# Patient Record
Sex: MALE | Race: WHITE | Employment: OTHER | URBAN - METROPOLITAN AREA
[De-identification: names, ages, dates, MRNs, and addresses within clinical notes are randomized per-mention and may not be internally consistent; named-entity substitution may affect disease eponyms.]

---

## 2022-01-05 ENCOUNTER — APPOINTMENT (OUTPATIENT)
Dept: ULTRASOUND IMAGING | Age: 57
DRG: 872 | End: 2022-01-05
Attending: EMERGENCY MEDICINE

## 2022-01-05 ENCOUNTER — HOSPITAL ENCOUNTER (INPATIENT)
Age: 57
LOS: 7 days | Discharge: HOME | DRG: 872 | End: 2022-01-12
Attending: EMERGENCY MEDICINE | Admitting: STUDENT IN AN ORGANIZED HEALTH CARE EDUCATION/TRAINING PROGRAM

## 2022-01-05 ENCOUNTER — APPOINTMENT (OUTPATIENT)
Dept: CT IMAGING | Age: 57
DRG: 872 | End: 2022-01-05
Attending: EMERGENCY MEDICINE

## 2022-01-05 ENCOUNTER — APPOINTMENT (OUTPATIENT)
Dept: GENERAL RADIOLOGY | Age: 57
DRG: 872 | End: 2022-01-05
Attending: STUDENT IN AN ORGANIZED HEALTH CARE EDUCATION/TRAINING PROGRAM

## 2022-01-05 DIAGNOSIS — L03.113 CELLULITIS OF RIGHT UPPER EXTREMITY: Primary | ICD-10-CM

## 2022-01-05 PROBLEM — I82.619 BASILIC VEIN THROMBOSIS: Status: ACTIVE | Noted: 2022-01-05

## 2022-01-05 PROBLEM — L03.90 CELLULITIS: Status: ACTIVE | Noted: 2022-01-05

## 2022-01-05 PROBLEM — E87.1 HYPONATREMIA: Status: ACTIVE | Noted: 2022-01-05

## 2022-01-05 PROBLEM — R05.9 COUGH: Status: ACTIVE | Noted: 2022-01-05

## 2022-01-05 PROBLEM — R07.9 CHEST PAIN: Status: ACTIVE | Noted: 2022-01-05

## 2022-01-05 PROBLEM — A41.9 SEPSIS (HCC): Status: ACTIVE | Noted: 2022-01-05

## 2022-01-05 PROBLEM — N17.9 AKI (ACUTE KIDNEY INJURY) (HCC): Status: ACTIVE | Noted: 2022-01-05

## 2022-01-05 LAB
ALBUMIN SERPL-MCNC: 3.3 G/DL (ref 3.5–5)
ALBUMIN/GLOB SERPL: 0.8 {RATIO} (ref 1.2–3.5)
ALP SERPL-CCNC: 73 U/L (ref 50–136)
ALT SERPL-CCNC: 38 U/L (ref 12–65)
ANION GAP SERPL CALC-SCNC: 7 MMOL/L (ref 7–16)
AST SERPL-CCNC: 19 U/L (ref 15–37)
ATRIAL RATE: 101 BPM
BASOPHILS # BLD: 0 K/UL (ref 0–0.2)
BASOPHILS NFR BLD: 0 % (ref 0–2)
BILIRUB SERPL-MCNC: 2.5 MG/DL (ref 0.2–1.1)
BILIRUB UR QL: ABNORMAL
BUN SERPL-MCNC: 18 MG/DL (ref 6–23)
CALCIUM SERPL-MCNC: 8.7 MG/DL (ref 8.3–10.4)
CALCULATED P AXIS, ECG09: 26 DEGREES
CALCULATED R AXIS, ECG10: 19 DEGREES
CALCULATED T AXIS, ECG11: 142 DEGREES
CHLORIDE SERPL-SCNC: 103 MMOL/L (ref 98–107)
CHOLEST SERPL-MCNC: 140 MG/DL
CO2 SERPL-SCNC: 23 MMOL/L (ref 21–32)
CREAT SERPL-MCNC: 1.69 MG/DL (ref 0.8–1.5)
CRP SERPL-MCNC: 26.6 MG/DL (ref 0–0.9)
DIAGNOSIS, 93000: NORMAL
DIFFERENTIAL METHOD BLD: ABNORMAL
EOSINOPHIL # BLD: 0 K/UL (ref 0–0.8)
EOSINOPHIL NFR BLD: 0 % (ref 0.5–7.8)
ERYTHROCYTE [DISTWIDTH] IN BLOOD BY AUTOMATED COUNT: 13.5 % (ref 11.9–14.6)
EST. AVERAGE GLUCOSE BLD GHB EST-MCNC: 103 MG/DL
GLOBULIN SER CALC-MCNC: 4 G/DL (ref 2.3–3.5)
GLUCOSE SERPL-MCNC: 140 MG/DL (ref 65–100)
GLUCOSE UR QL STRIP.AUTO: 250 MG/DL
HBA1C MFR BLD: 5.2 % (ref 4.2–6.3)
HCT VFR BLD AUTO: 40.6 % (ref 41.1–50.3)
HDLC SERPL-MCNC: 37 MG/DL (ref 40–60)
HDLC SERPL: 3.8 {RATIO}
HGB BLD-MCNC: 13.5 G/DL (ref 13.6–17.2)
IMM GRANULOCYTES # BLD AUTO: 0.1 K/UL (ref 0–0.5)
IMM GRANULOCYTES NFR BLD AUTO: 1 % (ref 0–5)
KETONES UR-MCNC: 15 MG/DL
LACTATE SERPL-SCNC: 1.4 MMOL/L (ref 0.4–2)
LACTATE SERPL-SCNC: 1.9 MMOL/L (ref 0.4–2)
LDLC SERPL CALC-MCNC: 85.8 MG/DL
LEUKOCYTE ESTERASE UR QL STRIP: NEGATIVE
LYMPHOCYTES # BLD: 0.3 K/UL (ref 0.5–4.6)
LYMPHOCYTES NFR BLD: 4 % (ref 13–44)
MCH RBC QN AUTO: 28.7 PG (ref 26.1–32.9)
MCHC RBC AUTO-ENTMCNC: 33.3 G/DL (ref 31.4–35)
MCV RBC AUTO: 86.2 FL (ref 79.6–97.8)
MONOCYTES # BLD: 0.3 K/UL (ref 0.1–1.3)
MONOCYTES NFR BLD: 4 % (ref 4–12)
NEUTS SEG # BLD: 7.4 K/UL (ref 1.7–8.2)
NEUTS SEG NFR BLD: 91 % (ref 43–78)
NITRITE UR QL: NEGATIVE
NRBC # BLD: 0 K/UL (ref 0–0.2)
P-R INTERVAL, ECG05: 155 MS
PH UR: 5.5 [PH] (ref 5–9)
PLATELET # BLD AUTO: 121 K/UL (ref 150–450)
PMV BLD AUTO: 10.5 FL (ref 9.4–12.3)
POTASSIUM SERPL-SCNC: 3.5 MMOL/L (ref 3.5–5.1)
PROCALCITONIN SERPL-MCNC: 2.13 NG/ML (ref 0–0.49)
PROT SERPL-MCNC: 7.3 G/DL (ref 6.3–8.2)
PROT UR QL: 100 MG/DL
Q-T INTERVAL, ECG07: 338 MS
QRS DURATION, ECG06: 91 MS
QTC CALCULATION (BEZET), ECG08: 439 MS
RBC # BLD AUTO: 4.71 M/UL (ref 4.23–5.6)
RBC # UR STRIP: ABNORMAL /UL
SERVICE CMNT-IMP: ABNORMAL
SODIUM SERPL-SCNC: 133 MMOL/L (ref 138–145)
SP GR UR: 1.02 (ref 1–1.02)
TRIGL SERPL-MCNC: 86 MG/DL (ref 35–150)
TROPONIN-HIGH SENSITIVITY: 22.1 PG/ML (ref 0–14)
URATE SERPL-MCNC: 4.9 MG/DL (ref 2.6–6)
UROBILINOGEN UR QL: 1 EU/DL (ref 0.2–1)
VENTRICULAR RATE, ECG03: 101 BPM
VLDLC SERPL CALC-MCNC: 17.2 MG/DL (ref 6–23)
WBC # BLD AUTO: 8.1 K/UL (ref 4.3–11.1)

## 2022-01-05 PROCEDURE — 87186 SC STD MICRODIL/AGAR DIL: CPT

## 2022-01-05 PROCEDURE — 74011250637 HC RX REV CODE- 250/637: Performed by: EMERGENCY MEDICINE

## 2022-01-05 PROCEDURE — 84145 PROCALCITONIN (PCT): CPT

## 2022-01-05 PROCEDURE — 96374 THER/PROPH/DIAG INJ IV PUSH: CPT

## 2022-01-05 PROCEDURE — 83605 ASSAY OF LACTIC ACID: CPT

## 2022-01-05 PROCEDURE — 87077 CULTURE AEROBIC IDENTIFY: CPT

## 2022-01-05 PROCEDURE — 96375 TX/PRO/DX INJ NEW DRUG ADDON: CPT

## 2022-01-05 PROCEDURE — 74011000636 HC RX REV CODE- 636: Performed by: EMERGENCY MEDICINE

## 2022-01-05 PROCEDURE — 74011250636 HC RX REV CODE- 250/636: Performed by: EMERGENCY MEDICINE

## 2022-01-05 PROCEDURE — 83036 HEMOGLOBIN GLYCOSYLATED A1C: CPT

## 2022-01-05 PROCEDURE — 84484 ASSAY OF TROPONIN QUANT: CPT

## 2022-01-05 PROCEDURE — 71045 X-RAY EXAM CHEST 1 VIEW: CPT

## 2022-01-05 PROCEDURE — 73201 CT UPPER EXTREMITY W/DYE: CPT

## 2022-01-05 PROCEDURE — 86140 C-REACTIVE PROTEIN: CPT

## 2022-01-05 PROCEDURE — 74011000258 HC RX REV CODE- 258: Performed by: STUDENT IN AN ORGANIZED HEALTH CARE EDUCATION/TRAINING PROGRAM

## 2022-01-05 PROCEDURE — 80053 COMPREHEN METABOLIC PANEL: CPT

## 2022-01-05 PROCEDURE — 87040 BLOOD CULTURE FOR BACTERIA: CPT

## 2022-01-05 PROCEDURE — 93005 ELECTROCARDIOGRAM TRACING: CPT | Performed by: EMERGENCY MEDICINE

## 2022-01-05 PROCEDURE — 74011250636 HC RX REV CODE- 250/636: Performed by: STUDENT IN AN ORGANIZED HEALTH CARE EDUCATION/TRAINING PROGRAM

## 2022-01-05 PROCEDURE — 87205 SMEAR GRAM STAIN: CPT

## 2022-01-05 PROCEDURE — 74011250637 HC RX REV CODE- 250/637: Performed by: STUDENT IN AN ORGANIZED HEALTH CARE EDUCATION/TRAINING PROGRAM

## 2022-01-05 PROCEDURE — 99285 EMERGENCY DEPT VISIT HI MDM: CPT

## 2022-01-05 PROCEDURE — 65270000029 HC RM PRIVATE

## 2022-01-05 PROCEDURE — 80061 LIPID PANEL: CPT

## 2022-01-05 PROCEDURE — 74011000258 HC RX REV CODE- 258: Performed by: EMERGENCY MEDICINE

## 2022-01-05 PROCEDURE — 93971 EXTREMITY STUDY: CPT

## 2022-01-05 PROCEDURE — 94762 N-INVAS EAR/PLS OXIMTRY CONT: CPT

## 2022-01-05 PROCEDURE — 84550 ASSAY OF BLOOD/URIC ACID: CPT

## 2022-01-05 PROCEDURE — 87150 DNA/RNA AMPLIFIED PROBE: CPT

## 2022-01-05 PROCEDURE — 96365 THER/PROPH/DIAG IV INF INIT: CPT

## 2022-01-05 PROCEDURE — 81003 URINALYSIS AUTO W/O SCOPE: CPT

## 2022-01-05 PROCEDURE — 85025 COMPLETE CBC W/AUTO DIFF WBC: CPT

## 2022-01-05 RX ORDER — ACETAMINOPHEN 325 MG/1
650 TABLET ORAL
Status: DISCONTINUED | OUTPATIENT
Start: 2022-01-05 | End: 2022-01-12 | Stop reason: HOSPADM

## 2022-01-05 RX ORDER — ONDANSETRON 2 MG/ML
4 INJECTION INTRAMUSCULAR; INTRAVENOUS ONCE
Status: COMPLETED | OUTPATIENT
Start: 2022-01-05 | End: 2022-01-05

## 2022-01-05 RX ORDER — ACETAMINOPHEN 500 MG
1000 TABLET ORAL
Status: COMPLETED | OUTPATIENT
Start: 2022-01-05 | End: 2022-01-05

## 2022-01-05 RX ORDER — SODIUM CHLORIDE 0.9 % (FLUSH) 0.9 %
5-40 SYRINGE (ML) INJECTION AS NEEDED
Status: DISCONTINUED | OUTPATIENT
Start: 2022-01-05 | End: 2022-01-12 | Stop reason: HOSPADM

## 2022-01-05 RX ORDER — HYDROMORPHONE HYDROCHLORIDE 1 MG/ML
0.5 INJECTION, SOLUTION INTRAMUSCULAR; INTRAVENOUS; SUBCUTANEOUS ONCE
Status: COMPLETED | OUTPATIENT
Start: 2022-01-05 | End: 2022-01-05

## 2022-01-05 RX ORDER — VANCOMYCIN HYDROCHLORIDE 1 G/20ML
INJECTION, POWDER, LYOPHILIZED, FOR SOLUTION INTRAVENOUS EVERY 24 HOURS
Status: DISCONTINUED | OUTPATIENT
Start: 2022-01-05 | End: 2022-01-05

## 2022-01-05 RX ORDER — BENZONATATE 100 MG/1
100 CAPSULE ORAL
Status: DISCONTINUED | OUTPATIENT
Start: 2022-01-05 | End: 2022-01-12 | Stop reason: HOSPADM

## 2022-01-05 RX ORDER — SODIUM CHLORIDE 0.9 % (FLUSH) 0.9 %
10 SYRINGE (ML) INJECTION
Status: COMPLETED | OUTPATIENT
Start: 2022-01-05 | End: 2022-01-05

## 2022-01-05 RX ORDER — GUAIFENESIN/DEXTROMETHORPHAN 100-10MG/5
10 SYRUP ORAL
Status: DISCONTINUED | OUTPATIENT
Start: 2022-01-05 | End: 2022-01-12 | Stop reason: HOSPADM

## 2022-01-05 RX ORDER — ONDANSETRON 2 MG/ML
4 INJECTION INTRAMUSCULAR; INTRAVENOUS
Status: DISCONTINUED | OUTPATIENT
Start: 2022-01-05 | End: 2022-01-12 | Stop reason: HOSPADM

## 2022-01-05 RX ORDER — POLYETHYLENE GLYCOL 3350 17 G/17G
17 POWDER, FOR SOLUTION ORAL DAILY PRN
Status: DISCONTINUED | OUTPATIENT
Start: 2022-01-05 | End: 2022-01-12 | Stop reason: HOSPADM

## 2022-01-05 RX ORDER — ENOXAPARIN SODIUM 100 MG/ML
40 INJECTION SUBCUTANEOUS DAILY
Status: DISCONTINUED | OUTPATIENT
Start: 2022-01-05 | End: 2022-01-12 | Stop reason: HOSPADM

## 2022-01-05 RX ORDER — VANCOMYCIN HYDROCHLORIDE
1250 EVERY 24 HOURS
Status: DISCONTINUED | OUTPATIENT
Start: 2022-01-05 | End: 2022-01-06

## 2022-01-05 RX ORDER — ONDANSETRON 4 MG/1
4 TABLET, ORALLY DISINTEGRATING ORAL
Status: DISCONTINUED | OUTPATIENT
Start: 2022-01-05 | End: 2022-01-12 | Stop reason: HOSPADM

## 2022-01-05 RX ORDER — CLINDAMYCIN PHOSPHATE 900 MG/50ML
900 INJECTION INTRAVENOUS
Status: COMPLETED | OUTPATIENT
Start: 2022-01-05 | End: 2022-01-05

## 2022-01-05 RX ORDER — MORPHINE SULFATE 4 MG/ML
4 INJECTION INTRAVENOUS
Status: DISCONTINUED | OUTPATIENT
Start: 2022-01-05 | End: 2022-01-07

## 2022-01-05 RX ORDER — SODIUM CHLORIDE 0.9 % (FLUSH) 0.9 %
5-40 SYRINGE (ML) INJECTION EVERY 8 HOURS
Status: DISCONTINUED | OUTPATIENT
Start: 2022-01-05 | End: 2022-01-12 | Stop reason: HOSPADM

## 2022-01-05 RX ORDER — SODIUM CHLORIDE 9 MG/ML
150 INJECTION, SOLUTION INTRAVENOUS CONTINUOUS
Status: DISCONTINUED | OUTPATIENT
Start: 2022-01-05 | End: 2022-01-07

## 2022-01-05 RX ORDER — ACETAMINOPHEN 650 MG/1
650 SUPPOSITORY RECTAL
Status: DISCONTINUED | OUTPATIENT
Start: 2022-01-05 | End: 2022-01-12 | Stop reason: HOSPADM

## 2022-01-05 RX ORDER — SODIUM CHLORIDE 0.9 % (FLUSH) 0.9 %
5-10 SYRINGE (ML) INJECTION EVERY 8 HOURS
Status: DISCONTINUED | OUTPATIENT
Start: 2022-01-05 | End: 2022-01-08

## 2022-01-05 RX ORDER — MORPHINE SULFATE 4 MG/ML
4 INJECTION INTRAVENOUS
Status: COMPLETED | OUTPATIENT
Start: 2022-01-05 | End: 2022-01-05

## 2022-01-05 RX ORDER — SODIUM CHLORIDE 0.9 % (FLUSH) 0.9 %
5-10 SYRINGE (ML) INJECTION AS NEEDED
Status: DISCONTINUED | OUTPATIENT
Start: 2022-01-05 | End: 2022-01-12 | Stop reason: HOSPADM

## 2022-01-05 RX ORDER — ENOXAPARIN SODIUM 100 MG/ML
40 INJECTION SUBCUTANEOUS DAILY
Status: DISCONTINUED | OUTPATIENT
Start: 2022-01-06 | End: 2022-01-05

## 2022-01-05 RX ADMIN — IOPAMIDOL 100 ML: 755 INJECTION, SOLUTION INTRAVENOUS at 13:33

## 2022-01-05 RX ADMIN — SODIUM CHLORIDE 150 ML/HR: 900 INJECTION, SOLUTION INTRAVENOUS at 21:36

## 2022-01-05 RX ADMIN — MORPHINE SULFATE 4 MG: 4 INJECTION INTRAVENOUS at 12:18

## 2022-01-05 RX ADMIN — MORPHINE SULFATE 4 MG: 4 INJECTION INTRAVENOUS at 18:03

## 2022-01-05 RX ADMIN — ONDANSETRON 4 MG: 2 INJECTION INTRAMUSCULAR; INTRAVENOUS at 12:18

## 2022-01-05 RX ADMIN — Medication 10 ML: at 13:34

## 2022-01-05 RX ADMIN — ACETAMINOPHEN 1000 MG: 500 TABLET ORAL at 11:43

## 2022-01-05 RX ADMIN — HYDROMORPHONE HYDROCHLORIDE 0.5 MG: 1 INJECTION, SOLUTION INTRAMUSCULAR; INTRAVENOUS; SUBCUTANEOUS at 21:33

## 2022-01-05 RX ADMIN — HYDROMORPHONE HYDROCHLORIDE 0.5 MG: 1 INJECTION, SOLUTION INTRAMUSCULAR; INTRAVENOUS; SUBCUTANEOUS at 15:12

## 2022-01-05 RX ADMIN — ACETAMINOPHEN 650 MG: 325 TABLET ORAL at 18:43

## 2022-01-05 RX ADMIN — CLINDAMYCIN PHOSPHATE 900 MG: 900 INJECTION, SOLUTION INTRAVENOUS at 12:31

## 2022-01-05 RX ADMIN — ENOXAPARIN SODIUM 40 MG: 100 INJECTION SUBCUTANEOUS at 20:08

## 2022-01-05 RX ADMIN — SODIUM CHLORIDE 150 ML/HR: 900 INJECTION, SOLUTION INTRAVENOUS at 13:57

## 2022-01-05 RX ADMIN — SODIUM CHLORIDE, SODIUM LACTATE, POTASSIUM CHLORIDE, AND CALCIUM CHLORIDE 1000 ML: 600; 310; 30; 20 INJECTION, SOLUTION INTRAVENOUS at 11:43

## 2022-01-05 RX ADMIN — SODIUM CHLORIDE 100 ML: 900 INJECTION, SOLUTION INTRAVENOUS at 13:33

## 2022-01-05 RX ADMIN — PIPERACILLIN SODIUM AND TAZOBACTAM SODIUM 3.38 G: 3; .375 INJECTION, POWDER, LYOPHILIZED, FOR SOLUTION INTRAVENOUS at 20:09

## 2022-01-05 RX ADMIN — VANCOMYCIN HYDROCHLORIDE 1250 MG: 500 INJECTION, POWDER, LYOPHILIZED, FOR SOLUTION INTRAVENOUS at 17:05

## 2022-01-05 NOTE — ED PROVIDER NOTES
Patient presents to the emergency department with complaints of pain and swelling to the right elbow as well as fever. He states he was working under a house 3 days ago in the morning and by the evening he noted a lesion or 2 lesions on his right elbow. Since then there has been increasing pain, swelling and redness as well as fever. He also reports some vomiting but denies diarrhea. He denies any history of gout. Review of systems otherwise negative. The history is provided by the patient. Rash   This is a new problem. The current episode started more than 2 days ago. The problem has been gradually worsening. Associated with: possible injury. There has been a fever of 100 - 100.9 F. Affected Location: right elbow and surrounding area. The pain is at a severity of 8/10. The pain is severe. The pain has been constant since onset. Associated symptoms include blisters and weeping. He has tried nothing for the symptoms. The treatment provided no relief. History reviewed. No pertinent past medical history. No past surgical history on file. History reviewed. No pertinent family history.     Social History     Socioeconomic History    Marital status:      Spouse name: Not on file    Number of children: Not on file    Years of education: Not on file    Highest education level: Not on file   Occupational History    Not on file   Tobacco Use    Smoking status: Not on file    Smokeless tobacco: Not on file   Substance and Sexual Activity    Alcohol use: Not on file    Drug use: Not on file    Sexual activity: Not on file   Other Topics Concern    Not on file   Social History Narrative    Not on file     Social Determinants of Health     Financial Resource Strain:     Difficulty of Paying Living Expenses: Not on file   Food Insecurity:     Worried About Running Out of Food in the Last Year: Not on file    Sumit of Food in the Last Year: Not on file   Transportation Needs:     Lack of Transportation (Medical): Not on file    Lack of Transportation (Non-Medical): Not on file   Physical Activity:     Days of Exercise per Week: Not on file    Minutes of Exercise per Session: Not on file   Stress:     Feeling of Stress : Not on file   Social Connections:     Frequency of Communication with Friends and Family: Not on file    Frequency of Social Gatherings with Friends and Family: Not on file    Attends Christianity Services: Not on file    Active Member of [address] [place] [address] or Organizations: Not on file    Attends Club or Organization Meetings: Not on file    Marital Status: Not on file   Intimate Partner Violence:     Fear of Current or Ex-Partner: Not on file    Emotionally Abused: Not on file    Physically Abused: Not on file    Sexually Abused: Not on file   Housing Stability:     Unable to Pay for Housing in the Last Year: Not on file    Number of [place] in the Last Year: Not on file    Unstable Housing in the Last Year: Not on file         ALLERGIES: Patient has no known allergies. Review of Systems   Constitutional: Positive for chills and fever. Gastrointestinal: Positive for nausea and vomiting. Skin: Positive for rash. All other systems reviewed and are negative. Vitals:    01/05/22 1037 01/05/22 1149   BP: (!) 163/76 132/78   Pulse: (!) 113 97   Resp: 22 19   Temp: (!) 100.9 °F (38.3 °C)    SpO2: 98%    Weight: 95.3 kg (210 lb)             Physical Exam  Vitals and nursing note reviewed. Constitutional:       General: He is not in acute distress. Appearance: Normal appearance. He is not ill-appearing, toxic-appearing or diaphoretic. HENT:      Head: Normocephalic and atraumatic. Eyes:      Extraocular Movements: Extraocular movements intact. Conjunctiva/sclera: Conjunctivae normal.      Pupils: Pupils are equal, round, and reactive to light. Cardiovascular:      Rate and Rhythm: Normal rate and regular rhythm.    Pulmonary:      Effort: Pulmonary effort is normal.   Chest:      Chest wall: No tenderness. Abdominal:      General: There is no distension. Tenderness: There is no abdominal tenderness. There is no guarding or rebound. Musculoskeletal:         General: Swelling and tenderness present. Right elbow: Swelling present. Tenderness present. Arms:       Comments: No evidence of compartment syndrome   Skin:     General: Skin is warm and dry. Capillary Refill: Capillary refill takes less than 2 seconds. Findings: Erythema present. Comments: There is swelling and induration surrounding the right elbow extending into the forearm and into the upper arm. There are 2 bulla noted overlying the elbow weeping serous fluid. Neurological:      Mental Status: He is alert. Psychiatric:         Mood and Affect: Mood normal.         Behavior: Behavior normal.         Thought Content: Thought content normal.          MDM  Number of Diagnoses or Management Options  Diagnosis management comments: Given the rapidity of the induration and erythema as well as fever, possibility of necrotizing fasciitis and is entertained patient will have a CT to evaluate further.        Amount and/or Complexity of Data Reviewed  Clinical lab tests: ordered and reviewed  Tests in the radiology section of CPT®: ordered and reviewed  Review and summarize past medical records: yes  Independent visualization of images, tracings, or specimens: yes    Risk of Complications, Morbidity, and/or Mortality  Presenting problems: moderate  Diagnostic procedures: moderate  Management options: moderate    Patient Progress  Patient progress: stable         Procedures

## 2022-01-05 NOTE — H&P
Hospitalist History and Physical   Admit Date:  2022 11:17 AM   Name:  Chapincito Hwang   Age:  64 y.o. Sex:  male  :  1965   MRN:  [id number]   Room:  [facility]/    Presenting Complaint: Blood infection    Reason(s) for Admission: Cellulitis [L03.90]     History of Present Illness:   Chapincito Hwang is a 64 y.o. male with no past medical history who presented with swelling, pain and erythema to his right elbow. 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  Patient was unsure of whether or not he had scraped his elbow or was bitten by a spider while under the home. Patient stated that the evening he began having some itching and noticed 2 lesions appear on his right elbow. Since that time there is just been increasing erythema, swelling pain and patient had developed fever. On elbow there were 2 areas of blisters that were weeping. Patient has not seen a primary care provider in several years. Patient's wife and son at bedside. Patient rated pain 8 out of 10 but relieved with pain medication that he had received in the emergency department. Patient takes Aleve daily for aches and pains due to his work and was taking for the pain in his elbow. Patient also complained of some cough since Friday. Patient presented with fever of 101.7, CRP of 26, pro-Tae 2.3. CT of his right upper extremities showed subcutaneous edema compatible with cellulitis. No evidence of soft tissue gas or abscess. There was questionable DVT in the right brachial vein. Ultrasound was ordered which showed complete thrombosis of his right basilic vein. Patient was started on empiric antibiotics. Review of Systems:  10 systems reviewed and negative except as noted in HPI.   Assessment & Plan:   Sepsis secondary to cellulitis (2022)  Tachycardia, fever, evidence of infection  Blood cultures pending  Started on empiric vancomycin and Zosyn  Maintenance IV fluids  Tylenol for fever  Consider infectious disease consult if worsening of cellulitis  Morphine as needed    Right basilic vein thrombosis  Initially seen on CT of his right upper extremity  Doppler ultrasound showed complete thrombus of right basilic vein  Continue warm compresses    Cough/chest pain  Chest x-ray without acute process  Patient complained of some pain with cough, can be secondary to subluxed rib. Tessalon Perle, Robitussin  EKG showed left ventricular hypertrophy without ST T wave inversions  Troponin slightly elevated, will get repeat troponin    Acute kidney injury  Creatinine 1.69  Likely secondary to hypovolemia  Continue maintenance IV fluids  Follow-up morning BMP    Hyponatremia  Continue maintenance IV fluids  Follow-up morning BMP      Dispo/Discharge Planning:   Dispo pending clinical course    Diet: ADULT DIET Regular  VTE ppx: Lovenox  Code status: Full Code    Hospital Problems as of 1/5/2022 Never Reviewed          Codes Class Noted - Resolved POA    Cellulitis ICD-10-CM: L03.90  ICD-9-CM: 682.9  1/5/2022 - Present Unknown              Past History:  History reviewed. No pertinent past medical history. No past surgical history on file. No Known Allergies   Social History     Tobacco Use    Smoking status: Not on file    Smokeless tobacco: Not on file   Substance Use Topics    Alcohol use: Not on file      History reviewed. No pertinent family history. Family history reviewed and negative except as noted above. There is no immunization history on file for this patient.   Prior to Admit Medications:  Current Outpatient Medications   Medication Instructions    HYDROcodone-acetaminophen (NORCO) 5-325 mg per tablet 1 Tablet, Oral, EVERY 4 HOURS AS NEEDED       Objective:     Patient Vitals for the past 24 hrs:   Temp Pulse Resp BP SpO2   01/05/22 1845  99 22  91 %   01/05/22 1844  (!) 108 18  (!) 89 %   01/05/22 1830 (!) 100.9 °F (38.3 °C) 96 12 128/80 93 %   01/05/22 1731  (!) 105 26 (!) 107/93 94 %   01/05/22 1700  87 17 128/75 95 %   01/05/22 1630  88 14 132/70    01/05/22 1430 98.8 °F (37.1 °C) 95 20 121/70 95 %   01/05/22 1400  95 16 130/74 98 %   01/05/22 1316  91 14  93 %   01/05/22 1315  94 14 122/70    01/05/22 1218 (!) 101.7 °F (38.7 °C)       01/05/22 1149  97 19 132/78    01/05/22 1124 (!) 102.2 °F (39 °C) (!) 101 16  97 %   01/05/22 1037 (!) 100.9 °F (38.3 °C) (!) 113 22 (!) 163/76 98 %     Oxygen Therapy  O2 Sat (%): 91 % (01/05/22 1845)  Pulse via Oximetry: 98 beats per minute (01/05/22 1845)  O2 Device: Nasal cannula (01/05/22 1845)  O2 Flow Rate (L/min): 2 l/min (01/05/22 1845)    Estimated body mass index is 28.48 kg/m² as calculated from the following:    Height as of this encounter: 6' (1.829 m). Weight as of this encounter: 95.3 kg (210 lb). Intake/Output Summary (Last 24 hours) at 1/5/2022 1850  Last data filed at 1/5/2022 1334  Gross per 24 hour   Intake 150 ml   Output    Net 150 ml         Physical Exam:  Blood pressure 128/80, pulse 99, temperature (!) 100.9 °F (38.3 °C), resp. rate 22, height 6' (1.829 m), weight 95.3 kg (210 lb), SpO2 91 %. General:    Well nourished. No overt distress  Head:  Normocephalic, atraumatic  Eyes:  Sclerae appear normal.  Pupils equally round. ENT:  Nares appear normal, no drainage. Moist oral mucosa  Neck:  No restricted ROM. Trachea midline   CV:   RRR. No m/r/g. No jugular venous distension. Lungs:   CTAB. No wheezing, rhonchi, or rales. Respirations even, unlabored  Abdomen: Bowel sounds present. Soft, nontender, nondistended. Extremities: No cyanosis or clubbing. Swelling to right upper extremity  Skin:     Swelling, erythema, two blisters to right elbow, very large area of cellulitis to right upper extremity  Neuro:  CN II-XII grossly intact. Sensation intact. A&Ox3  Psych:  Normal mood and affect.       I have reviewed ordered lab tests and independently visualized imaging below:    Last 24hr Labs:  Recent Results (from the past 24 hour(s))   LACTIC ACID    Collection Time: 01/05/22 10:43 AM   Result Value Ref Range    Lactic acid 1.9 0.4 - 2.0 MMOL/L   CBC WITH AUTOMATED DIFF    Collection Time: 01/05/22 10:43 AM   Result Value Ref Range    WBC 8.1 4.3 - 11.1 K/uL    RBC 4.71 4.23 - 5.6 M/uL    HGB 13.5 (L) 13.6 - 17.2 g/dL    HCT 40.6 (L) 41.1 - 50.3 %    MCV 86.2 79.6 - 97.8 FL    MCH 28.7 26.1 - 32.9 PG    MCHC 33.3 31.4 - 35.0 g/dL    RDW 13.5 11.9 - 14.6 %    PLATELET 661 (L) 216 - 450 K/uL    MPV 10.5 9.4 - 12.3 FL    ABSOLUTE NRBC 0.00 0.0 - 0.2 K/uL    DF AUTOMATED      NEUTROPHILS 91 (H) 43 - 78 %    LYMPHOCYTES 4 (L) 13 - 44 %    MONOCYTES 4 4.0 - 12.0 %    EOSINOPHILS 0 (L) 0.5 - 7.8 %    BASOPHILS 0 0.0 - 2.0 %    IMMATURE GRANULOCYTES 1 0.0 - 5.0 %    ABS. NEUTROPHILS 7.4 1.7 - 8.2 K/UL    ABS. LYMPHOCYTES 0.3 (L) 0.5 - 4.6 K/UL    ABS. MONOCYTES 0.3 0.1 - 1.3 K/UL    ABS. EOSINOPHILS 0.0 0.0 - 0.8 K/UL    ABS. BASOPHILS 0.0 0.0 - 0.2 K/UL    ABS. IMM. GRANS. 0.1 0.0 - 0.5 K/UL   METABOLIC PANEL, COMPREHENSIVE    Collection Time: 01/05/22 10:43 AM   Result Value Ref Range    Sodium 133 (L) 138 - 145 mmol/L    Potassium 3.5 3.5 - 5.1 mmol/L    Chloride 103 98 - 107 mmol/L    CO2 23 21 - 32 mmol/L    Anion gap 7 7 - 16 mmol/L    Glucose 140 (H) 65 - 100 mg/dL    BUN 18 6 - 23 MG/DL    Creatinine 1.69 (H) 0.8 - 1.5 MG/DL    GFR est AA 54 (L) >60 ml/min/1.73m2    GFR est non-AA 45 (L) >60 ml/min/1.73m2    Calcium 8.7 8.3 - 10.4 MG/DL    Bilirubin, total 2.5 (H) 0.2 - 1.1 MG/DL    ALT (SGPT) 38 12 - 65 U/L    AST (SGOT) 19 15 - 37 U/L    Alk.  phosphatase 73 50 - 136 U/L    Protein, total 7.3 6.3 - 8.2 g/dL    Albumin 3.3 (L) 3.5 - 5.0 g/dL    Globulin 4.0 (H) 2.3 - 3.5 g/dL    A-G Ratio 0.8 (L) 1.2 - 3.5     PROCALCITONIN    Collection Time: 01/05/22 10:43 AM   Result Value Ref Range    Procalcitonin 2.13 (H) 0.00 - 0.49 ng/mL   C REACTIVE PROTEIN, QT    Collection Time: 01/05/22 10:43 AM   Result Value Ref Range    C-Reactive protein 26.6 (H) 0.0 - 0.9 mg/dL   URIC ACID    Collection Time: 01/05/22 10:43 AM   Result Value Ref Range    Uric acid 4.9 2.6 - 6.0 MG/DL   TROPONIN-HIGH SENSITIVITY    Collection Time: 01/05/22 10:43 AM   Result Value Ref Range    Troponin-High Sensitivity 22.1 (H) 0 - 14 pg/mL   LIPID PANEL    Collection Time: 01/05/22 10:43 AM   Result Value Ref Range    Cholesterol, total 140 <200 MG/DL    Triglyceride 86 35 - 150 MG/DL    HDL Cholesterol 37 (L) 40 - 60 MG/DL    LDL, calculated 85.8 <100 MG/DL    VLDL, calculated 17.2 6.0 - 23.0 MG/DL    CHOL/HDL Ratio 3.8     HEMOGLOBIN A1C WITH EAG    Collection Time: 01/05/22 10:43 AM   Result Value Ref Range    Hemoglobin A1c 5.2 4.20 - 6.30 %    Est. average glucose 103 mg/dL   EKG, 12 LEAD, INITIAL    Collection Time: 01/05/22 11:23 AM   Result Value Ref Range    Ventricular Rate 101 BPM    Atrial Rate 101 BPM    P-R Interval 155 ms    QRS Duration 91 ms    Q-T Interval 338 ms    QTC Calculation (Bezet) 439 ms    Calculated P Axis 26 degrees    Calculated R Axis 19 degrees    Calculated T Axis 142 degrees    Diagnosis       Sinus tachycardia  Probable left atrial enlargement  LVH with secondary repolarization abnormality    Confirmed by ST BETH PABLO MD (), IMELDA MEDINA ([id number]) on 1/5/2022 3:19:05 PM     LACTIC ACID    Collection Time: 01/05/22 12:28 PM   Result Value Ref Range    Lactic acid 1.4 0.4 - 2.0 MMOL/L   POC URINE MACROSCOPIC    Collection Time: 01/05/22  1:17 PM   Result Value Ref Range    Spec. gravity (POC) 1.025 (H) 1.001 - 1.023      pH, urine  (POC) 5.5 5.0 - 9.0      Protein (POC) 100 (A) NEG mg/dL    Glucose, urine (POC) 250 (A) NEG mg/dL    Ketones (POC) 15 (A) NEG mg/dL    Bilirubin (POC) SMALL (A) NEG      Blood (POC) SMALL (A) NEG      Urobilinogen (POC) 1.0 0.2 - 1.0 EU/dL    Nitrite (POC) Negative NEG      Leukocyte esterase (POC) Negative NEG      Performed by Linda Lang        All Micro Results     Procedure Component Value Units Date/Time    BLOOD CULTURE [[id number]] Collected: 01/05/22 1228    Order Status: Completed Specimen: Blood Updated: 01/05/22 1255    BLOOD CULTURE [[id number]] Collected: 01/05/22 1043    Order Status: Completed Specimen: Blood Updated: 01/05/22 1125          Other Studies:  CT UP EXT RT W CONT    Result Date: 1/5/2022  Exam: CT of the right upper extremity with contrast INDICATION:  Concern for necrotizing fasciitis of the right upper extremity Comparison: None. Multiple axial images were obtained through the right upper extremity after intravenous injection of 100mL of Isovue 370. Radiation dose reduction techniques were used for this study: All CT scans performed at this facility use one or all of the following: Automated exposure control, adjustment of the mA and/or kVp according to patient's size, iterative reconstruction. FINDINGS: There is subcutaneous edema centered along the dorsal aspect of the upper arm, beginning approximately 16 cm above the elbow joint and extending distally to the level of the wrist with involvement of both volar and dorsal subcutaneous soft tissues of the forearm. There is confluent edema but no discrete organized rim-enhancing fluid collection. No soft tissue gas. Evaluation for deep compartment involvement is limited by CT. There is a filling defect within the right brachial vein with more distal involvement difficult to exclude. No acute osseous abnormality. 1. Significant subcutaneous edema involving the right upper arm compatible with cellulitis. No evidence of soft tissue gas or abscess. Evaluation for deep compartment involvement is limited by CT. 2. Filling defect within at least the right brachial vein concerning for deep vein thrombosis. Recommend correlation with right right upper extremity ultrasound.      XR CHEST PORT    Result Date: 1/5/2022  EXAM: XR CHEST PORT INDICATION: meets SIRS criteria COMPARISON: None FINDINGS: The cardiomediastinal silhouette is within normal limits. No focal parenchymal process. No pleural effusion. No pneumothorax. No acute osseous abnormality. No acute cardiopulmonary abnormality. DUPLEX UPPER EXT VENOUS RIGHT    Result Date: 1/5/2022  Right upper extremity venous ultrasound INDICATION:  Right arm swelling FINDINGS: Doppler ultrasound of the right upper extremity shows complete thrombosis of the right basilic vein. Subclavian and axillary veins are patent. Cephalic and forearm veins are also patent.      Thrombosis of the right basilic vein       Medications Administered     0.9% sodium chloride infusion     Admin Date  01/05/2022 Action  New Bag Dose  150 mL/hr Rate  150 mL/hr Route  IntraVENous Administered By  Judith Ruiz RN          acetaminophen (TYLENOL) tablet 1,000 mg     Admin Date  01/05/2022 Action  Given Dose  1,000 mg Route  Oral Administered By  Judith Ruiz RN          acetaminophen (TYLENOL) tablet 650 mg     Admin Date  01/05/2022 Action  Given Dose  650 mg Route  Oral Administered By  Judith Ruiz RN          clindamycin (CLEOCIN) 900mg D5W 50mL IVPB (premix)     Admin Date  01/05/2022 Action  New Bag Dose  900 mg Rate  100 mL/hr Route  IntraVENous Administered By  Judith Ruiz RN          HYDROmorphone (DILAUDID) injection 0.5 mg     Admin Date  01/05/2022 Action  Given Dose  0.5 mg Route  IntraVENous Administered By  Garth Lowe RN          iopamidoL (ISOVUE-370) 76 % injection 100 mL     Admin Date  01/05/2022 Action  Given Dose  100 mL Route  IntraVENous Administered By  Jone Barbour          lactated ringers bolus infusion 1,000 mL     Admin Date  01/05/2022 Action  New Bag Dose  1,000 mL Rate  1,000 mL/hr Route  IntraVENous Administered By  Judith Ruiz RN          morphine injection 4 mg     Admin Date  01/05/2022 Action  Given Dose  4 mg Route  IntraVENous Administered By  Ramila Santana RN           Admin Date  01/05/2022 Action  Given Dose  4 mg Route  IntraVENous Administered By  Judith Ruiz RN          ondansetron [facility]) injection 4 mg     Admin Date  01/05/2022 Action  Given Dose  4 mg Route  IntraVENous Administered By  Ramila Santana RN          saline peripheral flush soln 10 mL     Admin Date  01/05/2022 Action  Given Dose  10 mL Route  InterCATHeter Administered By  Jone Brabour          sodium chloride 0.9 % bolus infusion 100 mL     Admin Date  01/05/2022 Action  New Bag Dose  100 mL Route  IntraVENous Administered By  Jone Barbour          vancomycin [facility]) 1250 mg in  ml infusion     Admin Date  01/05/2022 Action  New Bag Dose  1,250 mg Rate  125 mL/hr Route  IntraVENous Administered By  Garth Lowe RN                Signed:  Alexus Lugo MD    Part of this note may have been written by using a voice dictation software. The note has been proof read but may still contain some grammatical/other typographical errors.

## 2022-01-05 NOTE — ED NOTES
Patient denies wanting dinner tray at this time stating he is not hungry. Patient denies any needs at this time.

## 2022-01-05 NOTE — PROGRESS NOTES
[facility] Pharmacokinetic Monitoring Service - Vancomycin     Bobby Yao is a 64 y.o. male starting on vancomycin therapy for SSTI. Pharmacy consulted by Dr. Colin Reddy for monitoring and adjustment. Target Concentration: Goal AUC/MURRAY 400-600 mg*hr/L    Additional Antimicrobials: N/A    Pertinent Laboratory Values: Wt Readings from Last 1 Encounters:   01/05/22 95.3 kg (210 lb)     Temp Readings from Last 1 Encounters:   01/05/22 (!) 101.7 °F (38.7 °C)     No components found for: PROCAL  Recent Labs     01/05/22  1228 01/05/22  1043   BUN  --  18   CREA  --  1.69*   WBC  --  8.1   PCT  --  2.13*   LAC 1.4 1.9     Estimated Creatinine Clearance: 58.5 mL/min (A) (based on SCr of 1.69 mg/dL (H)). No results found for: Todd Viera    MRSA Nasal Swab: N/A. Non-respiratory infection. .    Plan:  Dosing recommendations based on Bayesian software  Start vancomycin 1250 mg q24 hours  Anticipated AUC of 421 and trough concentration of 12.8 at steady state  Renal labs as indicated   Vancomycin concentration ordered for 1/6 @ 0400   Pharmacy will continue to monitor patient and adjust therapy as indicated    Thank you for the consult,  Adela Bar

## 2022-01-05 NOTE — ED TRIAGE NOTES
Patient presents with redness, swelling, and warmth to right elbow. Patient with fever and tachycardia. Patient presents with blue line drawn by family and spreading redness.

## 2022-01-06 ENCOUNTER — APPOINTMENT (OUTPATIENT)
Dept: NON INVASIVE DIAGNOSTICS | Age: 57
DRG: 872 | End: 2022-01-06
Attending: STUDENT IN AN ORGANIZED HEALTH CARE EDUCATION/TRAINING PROGRAM

## 2022-01-06 ENCOUNTER — APPOINTMENT (OUTPATIENT)
Dept: CT IMAGING | Age: 57
DRG: 872 | End: 2022-01-06
Attending: NURSE PRACTITIONER

## 2022-01-06 PROBLEM — I76 SEPTIC EMBOLISM (HCC): Status: ACTIVE | Noted: 2022-01-06

## 2022-01-06 PROBLEM — R78.81 BACTEREMIA DUE TO GRAM-POSITIVE BACTERIA: Status: ACTIVE | Noted: 2022-01-06

## 2022-01-06 LAB
ACC. NO. FROM MICRO ORDER, ACCP: ABNORMAL
ALBUMIN SERPL-MCNC: 2.3 G/DL (ref 3.5–5)
ALBUMIN/GLOB SERPL: 0.6 {RATIO} (ref 1.2–3.5)
ALP SERPL-CCNC: 48 U/L (ref 50–136)
ALT SERPL-CCNC: 32 U/L (ref 12–65)
ANION GAP SERPL CALC-SCNC: 9 MMOL/L (ref 7–16)
AST SERPL-CCNC: 26 U/L (ref 15–37)
BASOPHILS # BLD: 0 K/UL (ref 0–0.2)
BASOPHILS NFR BLD: 1 % (ref 0–2)
BILIRUB SERPL-MCNC: 2 MG/DL (ref 0.2–1.1)
BUN SERPL-MCNC: 19 MG/DL (ref 6–23)
CALCIUM SERPL-MCNC: 8.3 MG/DL (ref 8.3–10.4)
CHLORIDE SERPL-SCNC: 103 MMOL/L (ref 98–107)
CO2 SERPL-SCNC: 21 MMOL/L (ref 21–32)
COVID-19 RAPID TEST, COVR: NOT DETECTED
CREAT SERPL-MCNC: 1.46 MG/DL (ref 0.8–1.5)
CRP SERPL-MCNC: 25.9 MG/DL (ref 0–0.9)
DIFFERENTIAL METHOD BLD: ABNORMAL
EOSINOPHIL # BLD: 0 K/UL (ref 0–0.8)
EOSINOPHIL NFR BLD: 0 % (ref 0.5–7.8)
ERYTHROCYTE [DISTWIDTH] IN BLOOD BY AUTOMATED COUNT: 14.1 % (ref 11.9–14.6)
GLOBULIN SER CALC-MCNC: 3.6 G/DL (ref 2.3–3.5)
GLUCOSE SERPL-MCNC: 110 MG/DL (ref 65–100)
HCT VFR BLD AUTO: 34.6 % (ref 41.1–50.3)
HGB BLD-MCNC: 11.4 G/DL (ref 13.6–17.2)
HIV 1+2 AB+HIV1 P24 AG SERPL QL IA: NONREACTIVE
HIV12 RESULT COMMENT, HHIVC: ABNORMAL
IMM GRANULOCYTES # BLD AUTO: 0 K/UL (ref 0–0.5)
IMM GRANULOCYTES NFR BLD AUTO: 0 % (ref 0–5)
INTERPRETATION: ABNORMAL
LACTATE SERPL-SCNC: 1.1 MMOL/L (ref 0.4–2)
LYMPHOCYTES # BLD: 0.4 K/UL (ref 0.5–4.6)
LYMPHOCYTES NFR BLD: 9 % (ref 13–44)
MAGNESIUM SERPL-MCNC: 2.6 MG/DL (ref 1.8–2.4)
MCH RBC QN AUTO: 28.6 PG (ref 26.1–32.9)
MCHC RBC AUTO-ENTMCNC: 32.9 G/DL (ref 31.4–35)
MCV RBC AUTO: 86.7 FL (ref 79.6–97.8)
MECA (METHICILLIN-RESISTANCE GENES), MRGP: DETECTED
MONOCYTES # BLD: 0.4 K/UL (ref 0.1–1.3)
MONOCYTES NFR BLD: 9 % (ref 4–12)
NEUTS SEG # BLD: 4 K/UL (ref 1.7–8.2)
NEUTS SEG NFR BLD: 82 % (ref 43–78)
NRBC # BLD: 0 K/UL (ref 0–0.2)
PLATELET # BLD AUTO: 94 K/UL (ref 150–450)
PMV BLD AUTO: 11.4 FL (ref 9.4–12.3)
POTASSIUM SERPL-SCNC: 4 MMOL/L (ref 3.5–5.1)
PROCALCITONIN SERPL-MCNC: 6.43 NG/ML (ref 0–0.49)
PROT SERPL-MCNC: 5.9 G/DL (ref 6.3–8.2)
RBC # BLD AUTO: 3.99 M/UL (ref 4.23–5.6)
SODIUM SERPL-SCNC: 133 MMOL/L (ref 136–145)
SOURCE, COVRS: NORMAL
STAPHYLOCOCCUS AUREUS: DETECTED
STAPHYLOCOCCUS, STAPP: DETECTED
TROPONIN-HIGH SENSITIVITY: 35.7 PG/ML (ref 0–14)
VANCOMYCIN SERPL-MCNC: 6.4 UG/ML
WBC # BLD AUTO: 4.9 K/UL (ref 4.3–11.1)

## 2022-01-06 PROCEDURE — 74011000250 HC RX REV CODE- 250: Performed by: STUDENT IN AN ORGANIZED HEALTH CARE EDUCATION/TRAINING PROGRAM

## 2022-01-06 PROCEDURE — 74011000258 HC RX REV CODE- 258: Performed by: STUDENT IN AN ORGANIZED HEALTH CARE EDUCATION/TRAINING PROGRAM

## 2022-01-06 PROCEDURE — 2709999900 HC NON-CHARGEABLE SUPPLY

## 2022-01-06 PROCEDURE — 74011000636 HC RX REV CODE- 636: Performed by: INTERNAL MEDICINE

## 2022-01-06 PROCEDURE — 85025 COMPLETE CBC W/AUTO DIFF WBC: CPT

## 2022-01-06 PROCEDURE — 65270000029 HC RM PRIVATE

## 2022-01-06 PROCEDURE — 74011250637 HC RX REV CODE- 250/637: Performed by: STUDENT IN AN ORGANIZED HEALTH CARE EDUCATION/TRAINING PROGRAM

## 2022-01-06 PROCEDURE — 74011000250 HC RX REV CODE- 250: Performed by: EMERGENCY MEDICINE

## 2022-01-06 PROCEDURE — 74011250637 HC RX REV CODE- 250/637: Performed by: INTERNAL MEDICINE

## 2022-01-06 PROCEDURE — 36415 COLL VENOUS BLD VENIPUNCTURE: CPT

## 2022-01-06 PROCEDURE — 74011250637 HC RX REV CODE- 250/637: Performed by: EMERGENCY MEDICINE

## 2022-01-06 PROCEDURE — 87635 SARS-COV-2 COVID-19 AMP PRB: CPT

## 2022-01-06 PROCEDURE — 86140 C-REACTIVE PROTEIN: CPT

## 2022-01-06 PROCEDURE — 83605 ASSAY OF LACTIC ACID: CPT

## 2022-01-06 PROCEDURE — 74011000258 HC RX REV CODE- 258: Performed by: INTERNAL MEDICINE

## 2022-01-06 PROCEDURE — 83735 ASSAY OF MAGNESIUM: CPT

## 2022-01-06 PROCEDURE — 74011250636 HC RX REV CODE- 250/636: Performed by: EMERGENCY MEDICINE

## 2022-01-06 PROCEDURE — 84145 PROCALCITONIN (PCT): CPT

## 2022-01-06 PROCEDURE — 87389 HIV-1 AG W/HIV-1&-2 AB AG IA: CPT

## 2022-01-06 PROCEDURE — 74011250636 HC RX REV CODE- 250/636: Performed by: STUDENT IN AN ORGANIZED HEALTH CARE EDUCATION/TRAINING PROGRAM

## 2022-01-06 PROCEDURE — 80053 COMPREHEN METABOLIC PANEL: CPT

## 2022-01-06 PROCEDURE — 74011250636 HC RX REV CODE- 250/636: Performed by: INTERNAL MEDICINE

## 2022-01-06 PROCEDURE — 71260 CT THORAX DX C+: CPT

## 2022-01-06 PROCEDURE — 80202 ASSAY OF VANCOMYCIN: CPT

## 2022-01-06 RX ORDER — HYDROMORPHONE HYDROCHLORIDE 1 MG/ML
0.2 INJECTION, SOLUTION INTRAMUSCULAR; INTRAVENOUS; SUBCUTANEOUS
Status: DISCONTINUED | OUTPATIENT
Start: 2022-01-06 | End: 2022-01-07

## 2022-01-06 RX ORDER — VANCOMYCIN HYDROCHLORIDE
1250
Status: DISCONTINUED | OUTPATIENT
Start: 2022-01-06 | End: 2022-01-09

## 2022-01-06 RX ORDER — IBUPROFEN 600 MG/1
600 TABLET ORAL
COMMUNITY
End: 2022-01-12

## 2022-01-06 RX ORDER — SODIUM CHLORIDE 0.9 % (FLUSH) 0.9 %
10 SYRINGE (ML) INJECTION
Status: COMPLETED | OUTPATIENT
Start: 2022-01-06 | End: 2022-01-06

## 2022-01-06 RX ORDER — CHOLECALCIFEROL (VITAMIN D3) 125 MCG
5 CAPSULE ORAL
Status: DISCONTINUED | OUTPATIENT
Start: 2022-01-06 | End: 2022-01-12 | Stop reason: HOSPADM

## 2022-01-06 RX ORDER — HYDROCODONE BITARTRATE AND ACETAMINOPHEN 5; 325 MG/1; MG/1
1 TABLET ORAL
Status: DISCONTINUED | OUTPATIENT
Start: 2022-01-06 | End: 2022-01-07

## 2022-01-06 RX ORDER — DIPHENHYDRAMINE HCL 25 MG
25 CAPSULE ORAL
Status: DISCONTINUED | OUTPATIENT
Start: 2022-01-06 | End: 2022-01-12 | Stop reason: HOSPADM

## 2022-01-06 RX ADMIN — PIPERACILLIN SODIUM AND TAZOBACTAM SODIUM 3.38 G: 3; .375 INJECTION, POWDER, LYOPHILIZED, FOR SOLUTION INTRAVENOUS at 12:34

## 2022-01-06 RX ADMIN — SODIUM CHLORIDE, PRESERVATIVE FREE 10 ML: 5 INJECTION INTRAVENOUS at 21:48

## 2022-01-06 RX ADMIN — SODIUM CHLORIDE, PRESERVATIVE FREE 10 ML: 5 INJECTION INTRAVENOUS at 15:00

## 2022-01-06 RX ADMIN — SODIUM CHLORIDE 150 ML/HR: 900 INJECTION, SOLUTION INTRAVENOUS at 15:13

## 2022-01-06 RX ADMIN — HYDROCODONE BITARTRATE AND ACETAMINOPHEN 1 TABLET: 5; 325 TABLET ORAL at 12:34

## 2022-01-06 RX ADMIN — Medication: at 14:31

## 2022-01-06 RX ADMIN — VANCOMYCIN HYDROCHLORIDE 1250 MG: 10 INJECTION, POWDER, LYOPHILIZED, FOR SOLUTION INTRAVENOUS at 10:09

## 2022-01-06 RX ADMIN — DIPHENHYDRAMINE HYDROCHLORIDE 25 MG: 25 CAPSULE ORAL at 21:48

## 2022-01-06 RX ADMIN — Medication 5 MG: at 21:48

## 2022-01-06 RX ADMIN — PIPERACILLIN SODIUM AND TAZOBACTAM SODIUM 3.38 G: 3; .375 INJECTION, POWDER, LYOPHILIZED, FOR SOLUTION INTRAVENOUS at 03:11

## 2022-01-06 RX ADMIN — MORPHINE SULFATE 4 MG: 4 INJECTION INTRAVENOUS at 06:01

## 2022-01-06 RX ADMIN — MORPHINE SULFATE 4 MG: 4 INJECTION INTRAVENOUS at 15:12

## 2022-01-06 RX ADMIN — SODIUM CHLORIDE 100 ML: 900 INJECTION, SOLUTION INTRAVENOUS at 13:40

## 2022-01-06 RX ADMIN — SODIUM CHLORIDE, PRESERVATIVE FREE 5 ML: 5 INJECTION INTRAVENOUS at 01:58

## 2022-01-06 RX ADMIN — MORPHINE SULFATE 4 MG: 4 INJECTION INTRAVENOUS at 01:31

## 2022-01-06 RX ADMIN — HYDROMORPHONE HYDROCHLORIDE 0.2 MG: 1 INJECTION, SOLUTION INTRAMUSCULAR; INTRAVENOUS; SUBCUTANEOUS at 18:33

## 2022-01-06 RX ADMIN — IOPAMIDOL 100 ML: 755 INJECTION, SOLUTION INTRAVENOUS at 13:38

## 2022-01-06 RX ADMIN — SODIUM CHLORIDE 150 ML/HR: 900 INJECTION, SOLUTION INTRAVENOUS at 07:00

## 2022-01-06 RX ADMIN — Medication 10 ML: at 13:40

## 2022-01-06 RX ADMIN — ENOXAPARIN SODIUM 40 MG: 100 INJECTION SUBCUTANEOUS at 08:57

## 2022-01-06 NOTE — PROGRESS NOTES
VANCO DAILY FOLLOW UP NOTE  [address] [facility] Pharmacokinetic Monitoring Service - Vancomycin    Consulting Provider: Dr. Norwood Floor   Indication: Bloodstream infection  Target Concentration: Goal AUC/MURRAY 400-600 mg*hr/L  Day of Therapy: 2  Additional Antimicrobials: Pip/Tazo    Pertinent Laboratory Values: Wt Readings from Last 1 Encounters:   01/05/22 95.3 kg (210 lb)     Temp Readings from Last 1 Encounters:   01/06/22 98.7 °F (37.1 °C)     No components found for: PROCAL  Recent Labs     01/06/22  0429 01/05/22  1228 01/05/22  1043   BUN 19  --  18   CREA 1.46  --  1.69*   WBC 4.9  --  8.1   PCT 6.43*  --  2.13*   LAC 1.1 1.4 1.9     Estimated Creatinine Clearance: 67.7 mL/min (based on SCr of 1.46 mg/dL). Lab Results   Component Value Date/Time    Vancomycin, random 6.4 01/06/2022 04:29 AM       MRSA Nasal Swab: N/A. Non-respiratory infection. .      Assessment:  Date/Time Dose Concentration AUC   1/6 0429 1250 mg q24h 6.4 382   Note: Serum concentrations collected for AUC dosing may appear elevated if collected in close proximity to the dose administered, this is not necessarily an indication of toxicity    Plan:  1. Patient now growing MRSA in their blood, increase concentration target  2. Current dosing regimen is sub-therapeutic  3. Increase dose to 1250 mg every 18 hours for predicted AUC/Tr of 505/16.5  4. Repeat vancomycin concentration ordered for 1/7 @ 0100   5.  Pharmacy will continue to monitor patient and adjust therapy as indicated    Thank you for the consult,  Rashid Puente, MiraD

## 2022-01-06 NOTE — PROGRESS NOTES
BSN, CM met with pt and sister at bedside with appropriate PPE and social distancing. Pt lying in bed. Pt alert and oriented to person, place, time, and situation. Pt verified demographic information. Pt has no PCP and discussed [facility]. Will provide information on [employer] 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 Pt lives in 2 story home with spouse, 4 steps to enter into the home and bedroom on main level. Pt reports being independent with ADLs, working, and driving prior to admission. Pt reports having no DMEs. Pt family able to transport home, to doctor apt,  medications, and get groceries. Pt has no primary pharmacy but will discuss with wife to decide on pharmacy at pt request. Pt not on regular mediation so depending on cost he may need assistance. Pt reports plans to discharge home. Pt has never used HH or been to SNF. Will follow for medication needs. Pt currently on IV ABT and will follow for outpatient need. CM to continue to follow and monitor for any needs that may occur. Care Management Interventions  PCP Verified by CM: No (WIll provide information for [facility] )  Mode of Transport at Discharge:  Other (see comment) (family )  Transition of Care Consult (CM Consult): Discharge Planning  Discharge Durable Medical Equipment: No  Physical Therapy Consult: No  Occupational Therapy Consult: No  Speech Therapy Consult: No  Support Systems: Spouse/Significant Other,Other Family Member(s) (sibling )  Confirm Follow Up Transport: Family  The Plan for Transition of Care is Related to the Following Treatment Goals : Return to baseline  Freedom of Choice List was Provided with Basic Dialogue that Supports the Patient's Individualized Plan of Care/Goals, Treatment Preferences and Shares the Quality Data Associated with the Providers?: No  [place] Resource Information Provided?: No  Discharge Location  Discharge Placement: Home

## 2022-01-06 NOTE — WOUND CARE
Patient seen for 3 small broken blisters to right elbow. Noted areas pink to base and shallow. Patient has significant edema to arm and hands, painful. Noted DVT and cellulitis to arm. Cleaned and covered wet blisters with silicone foam. Ordered Aquaphor ointment to help with skin conditioning and instructed patient to keep arm elevated to help with edema. Ordered a foam wedge to help keep arm up but can use pillows if more can be found for patient. Wound team will monitor and adjust treatment plan as needed. Answered all questions at bedside.

## 2022-01-06 NOTE — PROGRESS NOTES
Hospitalist Progress Note   Admit Date:  2022 11:17 AM   Name:  Katie Lozano   Age:  64 y.o. Sex:  male  :  1965   MRN:  [id number]   Room:  [place] [address]    Presenting Complaint: Blood infection    Reason(s) for Admission: Cellulitis [L03.90]     Hospital Course & Interval History:   Patient is a 63 y/o male with no PMH who presented to ED with swelling, pain, erythema of right elbow. 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 Itching and 2 lesions appeared on his elbow that evening, this progressed to weeping blisters. Patient does not have a PCP; takes no home medications. Patient tried Aleve for elbow pain. Also endorses cough which started . ED workup: Febrile to 101.7, tachycardic, CRP of 26, Procal 2.3. CT RUE with subcutaneous edema compatible with cellulitis, no evidence of soft tissue gas or abscess. Questionable DVT right brachial vein. U/S RUE showed completed thrombosis of right basilic vein. Blood cultures were collected and patient was initiated on empiric antibiotics. Blood cultures 4/4 positive for GPC. Patient was admitted for further management. ID has been consulted for bacteremia. CT chest negative for PE but with possible septic embolic. TTE ordered. Currently on IV Vancomycin per ID. Subjective (22): Patient alert, oriented, complaint of SOB and chest tightening associated with respirations. RUE with worsening swelling. Fever is improving. Sister at bedside involved in plan of care. Morphine not helping the pain.     Assessment & Plan:     Sepsis ([address]  -Criteria on admission: Fever, Tachycardia  -LA 1.9 now 1.1, Procal 2.3 now 6.43  -s/p 1L LR bolus in ED, initiated on Clinda/Vanc, switched to Vanc/Zosyn  -ID evaluated , currently on IV Vancomycin only, they continue to follow    Cellulitis   -CT RUE Significant subcutaneous edema involving the right upper arm compatible with cellulitis. No evidence of soft tissue gas or abscess. -ID following, continue IV Vancomycin    Bacteremia due to Gram Positive Bacteria  -BCx 1/5 4/4 positive for GPC, further ID pending  -On Vanc/Zosyn  -ID following, stop Zosyn, continue IV Vancomycin    Right Basilic Vein Thrombosis   -Identified on U/S RUE; CT PE negative  -Continue Lovenox, RUE elevation (currently on wedge)    Cough/SOB   Probable Septic Emboli  -CXR with no acute cardiopulmonary abnormality  -Covid-19 negative  -prn antitussives  -Currently on 2L NC with O2 98%  -CT chest negative for PE, Multifocal peripheral nodular consolidation with area of probable cavitation in the right middle lobe periphery.  This may be caused by septic emboli.    -Discussed with ID, IV Vancomycin adequate for coverage    Chest pain   -EKG showed left ventricular hypertrophy without ST T wave inversions  -Troponin 22.1 --> 35.7  -Associated with respirations, likely pleuritic in nature  -TTE pending    PETER (acute kidney injury) ([facility] [address] improving  -Cr 1.69 on presentation, likely 2/2 hypovolemia, 1.46 this am, continue IVF     Hyponatremia  -Na 133, stable, continue IVF    Thrombocytopenia  -Plt 121 on presentation, currently 94, monitor am labs    Elevated Bilirubin  -TB 2.5 on presentation, LFT's wnl, trending down today, currently 2.0, possibly hyperbiliburinemia of sepsis, monitor am labs    Dispo/Discharge Planning:  Pending clinical course, likely will require 6 weeks of antibiotics    Diet:  ADULT DIET Regular  DVT PPx: Lovenox  Code status: Full Code    Hospital Problems as of 1/6/2022 Never Reviewed          Codes Class Noted - Resolved POA    Bacteremia due to Gram-positive bacteria ICD-10-CM: R78.81  ICD-9-CM: 790.7  1/6/2022 - Present Unknown        Cellulitis ICD-10-CM: L03.90  ICD-9-CM: 682.9  1/5/2022 - Present Unknown        * (Principal) Sepsis ([facility] [address] ICD-10-CM: A41.9  ICD-9-CM: 038.9, 995.91  1/5/2022 - Present Unknown        Basilic vein thrombosis ICD-10-CM: I82.619  ICD-9-CM: 453.81  1/5/2022 - Present Unknown        Cough ICD-10-CM: R05.9  ICD-9-CM: 786.2  1/5/2022 - Present Unknown        Chest pain ICD-10-CM: R07.9  ICD-9-CM: 786.50  1/5/2022 - Present Unknown        PETER (acute kidney injury) [facility]) ICD-10-CM: N17.9  ICD-9-CM: 584.9  1/5/2022 - Present Unknown        Hyponatremia ICD-10-CM: E87.1  ICD-9-CM: 276.1  1/5/2022 - Present Unknown              Objective:     Patient Vitals for the past 24 hrs:   Temp Pulse Resp BP SpO2   01/06/22 0332 98.7 °F (37.1 °C) 83 20 121/78 98 %   01/06/22 0117 98.3 °F (36.8 °C) 84 22 130/71 98 %   01/06/22 0000  87 14 127/84 97 %   01/05/22 2201  89 22  94 %   01/05/22 2200  87  120/76 94 %   01/05/22 2100  94 16 131/71 94 %   01/05/22 2006 (!) 100.8 °F (38.2 °C)       01/05/22 2000  97  118/68 91 %   01/05/22 1845  99 22  91 %   01/05/22 1844  (!) 108 18  (!) 89 %   01/05/22 1830 (!) 100.9 °F (38.3 °C) 96 12 128/80 93 %   01/05/22 1731  (!) 105 26 (!) 107/93 94 %   01/05/22 1700  87 17 128/75 95 %   01/05/22 1630  88 14 132/70    01/05/22 1430 98.8 °F (37.1 °C) 95 20 121/70 95 %   01/05/22 1400  95 16 130/74 98 %   01/05/22 1316  91 14  93 %   01/05/22 1315  94 14 122/70    01/05/22 1218 (!) 101.7 °F (38.7 °C)       01/05/22 1149  97 19 132/78    01/05/22 1124 (!) 102.2 °F (39 °C) (!) 101 16  97 %   01/05/22 1037 (!) 100.9 °F (38.3 °C) (!) 113 22 (!) 163/76 98 %     Oxygen Therapy  O2 Sat (%): 98 % (01/06/22 0332)  Pulse via Oximetry: 86 beats per minute (01/06/22 0000)  O2 Device: Nasal cannula (01/06/22 0332)  O2 Flow Rate (L/min): 2 l/min (01/06/22 0332)    Estimated body mass index is 28.48 kg/m² as calculated from the following:    Height as of this encounter: 6' (1.829 m). Weight as of this encounter: 95.3 kg (210 lb).     Intake/Output Summary (Last 24 hours) at 1/6/2022 0808  Last data filed at 1/6/2022 0610  Gross per 24 hour   Intake 1306 ml   Output 900 ml   Net 406 ml Physical Exam:   Blood pressure 121/78, pulse 83, temperature 98.7 °F (37.1 °C), resp. rate 20, height 6' (1.829 m), weight 95.3 kg (210 lb), SpO2 98 %. General:    No acute distress, alert, oriented, conversational.  Head:  Normocephalic, atraumatic  Eyes:  Sclerae appear normal.  Pupils equally round. ENT:  Nares appear normal, no drainage. Moist oral mucosa  Neck:  No restricted ROM. Trachea midline   CV:   RRR. No m/r/g. No jugular venous distension. Lungs:   Scattered crackles posterior, no wheezing, cough present with deep inspirations  Abdomen: Bowel sounds present. Soft, nontender, nondistended. Extremities: No cyanosis or clubbing. RUE with erythema not increased from prior marking, edematous, dressing intact to open wound  Skin:     No rashes and normal coloration. Warm and dry. Neuro:  CN II-XII grossly intact. Sensation intact. A&Ox3  Psych:  Normal mood and affect.       I have reviewed ordered lab tests and independently visualized imaging below:    Recent Labs:  Recent Results (from the past 48 hour(s))   CULTURE, BLOOD    Collection Time: 01/05/22 10:43 AM    Specimen: Blood   Result Value Ref Range    Special Requests: RIGHT  ARM        GRAM STAIN GRAM POSITIVE COCCI      GRAM STAIN AEROBIC AND ANAEROBIC BOTTLES      GRAM STAIN        RESULTS VERIFIED, PHONED TO AND READ BACK BY MADELYNRN @ 0130 1/6/22 MM    Culture result: CULTURE IN [facility] UPDATES TO FOLLOW      Culture result:        Refer to Blood Culture ID Panel Accession  [id number]     LACTIC ACID    Collection Time: 01/05/22 10:43 AM   Result Value Ref Range    Lactic acid 1.9 0.4 - 2.0 MMOL/L   CBC WITH AUTOMATED DIFF    Collection Time: 01/05/22 10:43 AM   Result Value Ref Range    WBC 8.1 4.3 - 11.1 K/uL    RBC 4.71 4.23 - 5.6 M/uL    HGB 13.5 (L) 13.6 - 17.2 g/dL    HCT 40.6 (L) 41.1 - 50.3 %    MCV 86.2 79.6 - 97.8 FL    MCH 28.7 26.1 - 32.9 PG    MCHC 33.3 31.4 - 35.0 g/dL    RDW 13.5 11.9 - 14.6 % PLATELET 448 (L) 715 - 450 K/uL    MPV 10.5 9.4 - 12.3 FL    ABSOLUTE NRBC 0.00 0.0 - 0.2 K/uL    DF AUTOMATED      NEUTROPHILS 91 (H) 43 - 78 %    LYMPHOCYTES 4 (L) 13 - 44 %    MONOCYTES 4 4.0 - 12.0 %    EOSINOPHILS 0 (L) 0.5 - 7.8 %    BASOPHILS 0 0.0 - 2.0 %    IMMATURE GRANULOCYTES 1 0.0 - 5.0 %    ABS. NEUTROPHILS 7.4 1.7 - 8.2 K/UL    ABS. LYMPHOCYTES 0.3 (L) 0.5 - 4.6 K/UL    ABS. MONOCYTES 0.3 0.1 - 1.3 K/UL    ABS. EOSINOPHILS 0.0 0.0 - 0.8 K/UL    ABS. BASOPHILS 0.0 0.0 - 0.2 K/UL    ABS. IMM. GRANS. 0.1 0.0 - 0.5 K/UL   METABOLIC PANEL, COMPREHENSIVE    Collection Time: 01/05/22 10:43 AM   Result Value Ref Range    Sodium 133 (L) 138 - 145 mmol/L    Potassium 3.5 3.5 - 5.1 mmol/L    Chloride 103 98 - 107 mmol/L    CO2 23 21 - 32 mmol/L    Anion gap 7 7 - 16 mmol/L    Glucose 140 (H) 65 - 100 mg/dL    BUN 18 6 - 23 MG/DL    Creatinine 1.69 (H) 0.8 - 1.5 MG/DL    GFR est AA 54 (L) >60 ml/min/1.73m2    GFR est non-AA 45 (L) >60 ml/min/1.73m2    Calcium 8.7 8.3 - 10.4 MG/DL    Bilirubin, total 2.5 (H) 0.2 - 1.1 MG/DL    ALT (SGPT) 38 12 - 65 U/L    AST (SGOT) 19 15 - 37 U/L    Alk.  phosphatase 73 50 - 136 U/L    Protein, total 7.3 6.3 - 8.2 g/dL    Albumin 3.3 (L) 3.5 - 5.0 g/dL    Globulin 4.0 (H) 2.3 - 3.5 g/dL    A-G Ratio 0.8 (L) 1.2 - 3.5     PROCALCITONIN    Collection Time: 01/05/22 10:43 AM   Result Value Ref Range    Procalcitonin 2.13 (H) 0.00 - 0.49 ng/mL   C REACTIVE PROTEIN, QT    Collection Time: 01/05/22 10:43 AM   Result Value Ref Range    C-Reactive protein 26.6 (H) 0.0 - 0.9 mg/dL   URIC ACID    Collection Time: 01/05/22 10:43 AM   Result Value Ref Range    Uric acid 4.9 2.6 - 6.0 MG/DL   TROPONIN-HIGH SENSITIVITY    Collection Time: 01/05/22 10:43 AM   Result Value Ref Range    Troponin-High Sensitivity 22.1 (H) 0 - 14 pg/mL   LIPID PANEL    Collection Time: 01/05/22 10:43 AM   Result Value Ref Range    Cholesterol, total 140 <200 MG/DL    Triglyceride 86 35 - 150 MG/DL    HDL Cholesterol 37 (L) 40 - 60 MG/DL    LDL, calculated 85.8 <100 MG/DL    VLDL, calculated 17.2 6.0 - 23.0 MG/DL    CHOL/HDL Ratio 3.8     HEMOGLOBIN A1C WITH EAG    Collection Time: 01/05/22 10:43 AM   Result Value Ref Range    Hemoglobin A1c 5.2 4.20 - 6.30 %    Est. average glucose 103 mg/dL   BLOOD CULTURE ID PANEL    Collection Time: 01/05/22 10:43 AM    Specimen: Blood   Result Value Ref Range    Acc. no. from Micro Order [id number]     Staphylococcus Detected (A) NOTDET      Staphylococcus aureus Detected (A) NOTDET      mecA (Methicillin-Resistance Genes) Detected (A) NOTDET      INTERPRETATION        Gram positive cocci in clusters, identified by realtime PCR as SUSPECTED MRSA. EKG, 12 LEAD, INITIAL    Collection Time: 01/05/22 11:23 AM   Result Value Ref Range    Ventricular Rate 101 BPM    Atrial Rate 101 BPM    P-R Interval 155 ms    QRS Duration 91 ms    Q-T Interval 338 ms    QTC Calculation (Bezet) 439 ms    Calculated P Axis 26 degrees    Calculated R Axis 19 degrees    Calculated T Axis 142 degrees    Diagnosis       Sinus tachycardia  Probable left atrial enlargement  LVH with secondary repolarization abnormality    Confirmed by ST BETH PABLO MD (), IMELDA MEDINA ([id number]) on 1/5/2022 3:19:05 PM     CULTURE, BLOOD    Collection Time: 01/05/22 12:28 PM    Specimen: Blood   Result Value Ref Range    Special Requests: LEFT  HAND        GRAM STAIN GRAM POSITIVE COCCI      GRAM STAIN AEROBIC AND ANAEROBIC BOTTLES      GRAM STAIN        CRITICAL RESULT NOT CALLED DUE TO PREVIOUS NOTIFICATION OF CRITICAL RESULT WITHIN THE LAST 24 HOURS.     Culture result: CULTURE IN PROGRESS,FURTHER UPDATES TO FOLLOW     LACTIC ACID    Collection Time: 01/05/22 12:28 PM   Result Value Ref Range    Lactic acid 1.4 0.4 - 2.0 MMOL/L   POC URINE MACROSCOPIC    Collection Time: 01/05/22  1:17 PM   Result Value Ref Range    Spec. gravity (POC) 1.025 (H) 1.001 - 1.023      pH, urine  (POC) 5.5 5.0 - 9.0      Protein (POC) 100 (A) NEG mg/dL    Glucose, urine (POC) 250 (A) NEG mg/dL    Ketones (POC) 15 (A) NEG mg/dL    Bilirubin (POC) SMALL (A) NEG      Blood (POC) SMALL (A) NEG      Urobilinogen (POC) 1.0 0.2 - 1.0 EU/dL    Nitrite (POC) Negative NEG      Leukocyte esterase (POC) Negative NEG      Performed by Jaqueline Clifford    TROPONIN-HIGH SENSITIVITY    Collection Time: 01/05/22 11:26 PM   Result Value Ref Range    Troponin-High Sensitivity 35.7 (H) 0 - 14 pg/mL   METABOLIC PANEL, COMPREHENSIVE    Collection Time: 01/06/22  4:29 AM   Result Value Ref Range    Sodium 133 (L) 136 - 145 mmol/L    Potassium 4.0 3.5 - 5.1 mmol/L    Chloride 103 98 - 107 mmol/L    CO2 21 21 - 32 mmol/L    Anion gap 9 7 - 16 mmol/L    Glucose 110 (H) 65 - 100 mg/dL    BUN 19 6 - 23 MG/DL    Creatinine 1.46 0.8 - 1.5 MG/DL    GFR est AA >60 >60 ml/min/1.73m2    GFR est non-AA 53 (L) >60 ml/min/1.73m2    Calcium 8.3 8.3 - 10.4 MG/DL    Bilirubin, total 2.0 (H) 0.2 - 1.1 MG/DL    ALT (SGPT) 32 12 - 65 U/L    AST (SGOT) 26 15 - 37 U/L    Alk.  phosphatase 48 (L) 50 - 136 U/L    Protein, total 5.9 (L) 6.3 - 8.2 g/dL    Albumin 2.3 (L) 3.5 - 5.0 g/dL    Globulin 3.6 (H) 2.3 - 3.5 g/dL    A-G Ratio 0.6 (L) 1.2 - 3.5     MAGNESIUM    Collection Time: 01/06/22  4:29 AM   Result Value Ref Range    Magnesium 2.6 (H) 1.8 - 2.4 mg/dL   LACTIC ACID    Collection Time: 01/06/22  4:29 AM   Result Value Ref Range    Lactic acid 1.1 0.4 - 2.0 MMOL/L   CBC WITH AUTOMATED DIFF    Collection Time: 01/06/22  4:29 AM   Result Value Ref Range    WBC 4.9 4.3 - 11.1 K/uL    RBC 3.99 (L) 4.23 - 5.6 M/uL    HGB 11.4 (L) 13.6 - 17.2 g/dL    HCT 34.6 (L) 41.1 - 50.3 %    MCV 86.7 79.6 - 97.8 FL    MCH 28.6 26.1 - 32.9 PG    MCHC 32.9 31.4 - 35.0 g/dL    RDW 14.1 11.9 - 14.6 %    PLATELET 94 (L) 659 - 450 K/uL    MPV 11.4 9.4 - 12.3 FL    ABSOLUTE NRBC 0.00 0.0 - 0.2 K/uL    DF AUTOMATED      NEUTROPHILS 82 (H) 43 - 78 %    LYMPHOCYTES 9 (L) 13 - 44 %    MONOCYTES 9 4.0 - 12.0 %    EOSINOPHILS 0 (L) 0.5 - 7.8 % BASOPHILS 1 0.0 - 2.0 %    IMMATURE GRANULOCYTES 0 0.0 - 5.0 %    ABS. NEUTROPHILS 4.0 1.7 - 8.2 K/UL    ABS. LYMPHOCYTES 0.4 (L) 0.5 - 4.6 K/UL    ABS. MONOCYTES 0.4 0.1 - 1.3 K/UL    ABS. EOSINOPHILS 0.0 0.0 - 0.8 K/UL    ABS. BASOPHILS 0.0 0.0 - 0.2 K/UL    ABS. IMM. GRANS. 0.0 0.0 - 0.5 K/UL   VANCOMYCIN, RANDOM    Collection Time: 01/06/22  4:29 AM   Result Value Ref Range    Vancomycin, random 6.4 UG/ML   PROCALCITONIN    Collection Time: 01/06/22  4:29 AM   Result Value Ref Range    Procalcitonin 6.43 (H) 0.00 - 0.49 ng/mL   C REACTIVE PROTEIN, QT    Collection Time: 01/06/22  4:29 AM   Result Value Ref Range    C-Reactive protein 25.9 (H) 0.0 - 0.9 mg/dL       All Micro Results     Procedure Component Value Units Date/Time    COVID-19 RAPID TEST [[id number]]     Order Status: Sent     BLOOD CULTURE ID PANEL [[id number]]  (Abnormal) Collected: 01/05/22 1043    Order Status: Completed Specimen: Blood Updated: 01/06/22 0713     Acc. no. from Micro Order [id number]     Staphylococcus Detected        Comment: RESULTS VERIFIED, PHONED TO AND READ BACK BY  BERTO JOSHI @ 0130 1/6/22 MM          Staphylococcus aureus Detected        Comment: RESULTS VERIFIED, PHONED TO AND READ BACK BY  BERTO JOSHI @ 0130 1/6/22 MM          mecA (Methicillin-Resistance Genes) Detected        Comment: Presence of mecA is highly indicative of methicillin resistance. The test does not replace traditional culture and susceptibilities        INTERPRETATION       Gram positive cocci in clusters, identified by realtime PCR as SUSPECTED MRSA. Comment: Recommend IV vancomycin use, unlikely to be a contaminant. Infectious Diseases Consult recommended in adult patients.   THIS TEST DOES NOT REPLACE SENSITIVITY TESTING.       BLOOD CULTURE [[id number]] Collected: 01/05/22 1228    Order Status: Completed Specimen: Blood Updated: 01/06/22 0249     Special Requests: --        LEFT  HAND       GRAM STAIN GRAM POSITIVE COCCI AEROBIC AND ANAEROBIC BOTTLES                  CRITICAL RESULT NOT CALLED DUE TO PREVIOUS NOTIFICATION OF CRITICAL RESULT WITHIN THE LAST 24 HOURS. Culture result:       CULTURE IN PROGRESS,FURTHER UPDATES TO FOLLOW          BLOOD CULTURE [[id number]] Collected: 01/05/22 1043    Order Status: Completed Specimen: Blood Updated: 01/06/22 0132     Special Requests: --        RIGHT  ARM       GRAM STAIN GRAM POSITIVE COCCI               AEROBIC AND ANAEROBIC BOTTLES                  RESULTS VERIFIED, PHONED TO AND READ BACK BY BERTO JOSHI @ 0130 1/6/22 MM           Culture result:       CULTURE IN [address] UPDATES TO FOLLOW                  Refer to Blood Culture ID Panel Accession  [id number]            Other Studies:  CT UP EXT RT W CONT    Result Date: 1/5/2022  Exam: CT of the right upper extremity with contrast INDICATION:  Concern for necrotizing fasciitis of the right upper extremity Comparison: None. Multiple axial images were obtained through the right upper extremity after intravenous injection of 100mL of Isovue 370. Radiation dose reduction techniques were used for this study: All CT scans performed at this facility use one or all of the following: Automated exposure control, adjustment of the mA and/or kVp according to patient's size, iterative reconstruction. FINDINGS: There is subcutaneous edema centered along the dorsal aspect of the upper arm, beginning approximately 16 cm above the elbow joint and extending distally to the level of the wrist with involvement of both volar and dorsal subcutaneous soft tissues of the forearm. There is confluent edema but no discrete organized rim-enhancing fluid collection. No soft tissue gas. Evaluation for deep compartment involvement is limited by CT. There is a filling defect within the right brachial vein with more distal involvement difficult to exclude. No acute osseous abnormality.      1. Significant subcutaneous edema involving the right upper arm compatible with cellulitis. No evidence of soft tissue gas or abscess. Evaluation for deep compartment involvement is limited by CT. 2. Filling defect within at least the right brachial vein concerning for deep vein thrombosis. Recommend correlation with right right upper extremity ultrasound. XR CHEST PORT    Result Date: 1/5/2022  EXAM: XR CHEST PORT INDICATION: meets SIRS criteria COMPARISON: None FINDINGS: The cardiomediastinal silhouette is within normal limits. No focal parenchymal process. No pleural effusion. No pneumothorax. No acute osseous abnormality. No acute cardiopulmonary abnormality. DUPLEX UPPER EXT VENOUS RIGHT    Result Date: 1/5/2022  Right upper extremity venous ultrasound INDICATION:  Right arm swelling FINDINGS: Doppler ultrasound of the right upper extremity shows complete thrombosis of the right basilic vein. Subclavian and axillary veins are patent. Cephalic and forearm veins are also patent.      Thrombosis of the right basilic vein       Current Meds:  Current Facility-Administered Medications   Medication Dose Route Frequency    influenza vaccine 2021-22 (6 mos+)(PF) (FLUARIX/FLULAVAL/FLUZONE QUAD) injection 0.5 mL  1 Each IntraMUSCular PRIOR TO DISCHARGE    sodium chloride (NS) flush 5-10 mL  5-10 mL IntraVENous Q8H    sodium chloride (NS) flush 5-10 mL  5-10 mL IntraVENous PRN    0.9% sodium chloride infusion  150 mL/hr IntraVENous CONTINUOUS    sodium chloride (NS) flush 5-40 mL  5-40 mL IntraVENous Q8H    sodium chloride (NS) flush 5-40 mL  5-40 mL IntraVENous PRN    acetaminophen (TYLENOL) tablet 650 mg  650 mg Oral Q6H PRN    Or    acetaminophen (TYLENOL) suppository 650 mg  650 mg Rectal Q6H PRN    polyethylene glycol (MIRALAX) packet 17 g  17 g Oral DAILY PRN    ondansetron (ZOFRAN ODT) tablet 4 mg  4 mg Oral Q8H PRN    Or    ondansetron (ZOFRAN) injection 4 mg  4 mg IntraVENous Q6H PRN    morphine injection 4 mg  4 mg IntraVENous Q4H PRN    vancomycin (VANCOCIN) 1250 mg in  ml infusion  1,250 mg IntraVENous Q24H    piperacillin-tazobactam (ZOSYN) 3.375 g in 0.9% sodium chloride (MBP/ADV) 100 mL MBP  3.375 g IntraVENous Q8H    enoxaparin (LOVENOX) injection 40 mg  40 mg SubCUTAneous DAILY    benzonatate (TESSALON) capsule 100 mg  100 mg Oral TID PRN    guaiFENesin-dextromethorphan (ROBITUSSIN DM) 100-10 mg/5 mL syrup 10 mL  10 mL Oral Q4H PRN     Labs, vital signs, diagnostic testing reviewed. Case discussed with patient, care team, Dr. Cruz Sears. Signed:  Deandra Vitale NP    Part of this note may have been written by using a voice dictation software. The note has been proof read but may still contain some grammatical/other typographical errors.

## 2022-01-06 NOTE — ED NOTES
TRANSFER - OUT REPORT:    Verbal report given to Angelika Barrett RN on Sol Joseph  being transferred to 9th floor for routine progression of care       Report consisted of patients Situation, Background, Assessment and   Recommendations(SBAR). Information from the following report(s) SBAR was reviewed with the receiving nurse. Lines:   Peripheral IV 01/05/22 Anterior; Left Forearm (Active)       Peripheral IV 01/05/22 Left;Posterior Wrist (Active)        Opportunity for questions and clarification was provided.       Patient transported with:   Monitor  O2 @ 2 liters  Registered Nurse

## 2022-01-06 NOTE — CONSULTS
Infectious Disease Consult    Today's Date: 1/6/2022   Admit Date: 1/5/2022    Impression:   · Staph bacteremia (1/5) secondary to skin and soft tissue infection  · RUE cellulitis  · Right basilic vein DVT    Plan:     · Continue IV Vancomycin and anti-coagulation as ordered. · Obtain CTA chest given shortness of breath and chest pressure. Anti-infectives:   · Clinda 1/5  · Zosyn 1/5-1/6  · Vanc 1/5-    Subjective:   Date of Consultation:  January 6, 2022  Referring Physician: Iesha Johnson NP    Patient is a 64 y.o. male who presented to ED on 1/5/22 with pain and swelling to right elbow. 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 He reported that evening he began itching and noticed 2 lesions appearing on his right elbow. Since then he has had increasing pain and erythema as well as fever. CRP 26, Tmax 101.7, procal 2.3. CT of RUE with findings consistent with cellulitis without abscess. US revealed complete thrombosis of his right basilic vein. He was started on Vanc/Zosyn. Blood cultures obtained now + for CoNS, ID as staph. ID has been consulted for assistance. He has no PMH and NKA. Today he is seen on supplemental O2 and reports chest pain. Patient Active Problem List   Diagnosis Code    Cellulitis L03.90    Sepsis ([facility] [address] S33.3    Basilic vein thrombosis C07.077    Cough R05.9    Chest pain R07.9    PETER (acute kidney injury) ([facility] [address] N17.9    Hyponatremia E87.1    Bacteremia due to Gram-positive bacteria R78.81     History reviewed. No pertinent past medical history. History reviewed. No pertinent family history. Social History     Tobacco Use    Smoking status: Not on file    Smokeless tobacco: Not on file   Substance Use Topics    Alcohol use: Not on file     No past surgical history on file. Prior to Admission medications    Medication Sig Start Date End Date Taking?  Authorizing Provider   ibuprofen (MOTRIN) 600 mg tablet Take 600 mg by mouth every six (6) hours as needed for Pain. Over the counter   Yes Provider, Historical   HYDROcodone-acetaminophen (NORCO) 5-325 mg per tablet Take 1 Tab by mouth every four (4) hours as needed for Pain. Patient not taking: Reported on 2022   Mary Course, DO       No Known Allergies     Review of Systems:  A comprehensive review of systems was negative except for that written in the History of Present Illness. Objective:     Visit Vitals  /74   Pulse 88   Temp 97.8 °F (36.6 °C)   Resp 17   Ht 6' (1.829 m)   Wt 95.3 kg (210 lb)   SpO2 95%   BMI 28.48 kg/m²     Temp (24hrs), Av °F (37.8 °C), Min:97.8 °F (36.6 °C), Max:102.2 °F (39 °C)       Lines:  Peripheral IV:       Physical Exam:    General:  Alert, cooperative, well noursished, well developed, appears stated age   Eyes:  Sclera anicteric. Pupils equally round and reactive to light. Mouth/Throat: Mucous membranes normal, oral pharynx clear   Neck: Supple   Lungs:   Clear to auscultation bilaterally, good effort; on O2 via NC   CV:  Regular rate and rhythm,no murmur, click, rub or gallop   Abdomen:   Soft, non-tender.  bowel sounds normal. non-distended   Extremities: R elbow erythema and induration noted (see photo below)   Skin: Skin color, texture, turgor normal. no acute rash or lesions   Lymph nodes: Cervical and supraclavicular normal   Musculoskeletal: No swelling or deformity   Lines/Devices:  Intact, no erythema, drainage or tenderness   Psych: Alert and oriented, normal mood affect given the setting           Data Review:     CBC:  Recent Labs     22  1043   WBC 4.9 8.1   GRANS 82* 91*   MONOS 9 4   EOS 0* 0*   ANEU 4.0 7.4   ABL 0.4* 0.3*   HGB 11.4* 13.5*   HCT 34.6* 40.6*   PLT 94* 121*       BMP:  Recent Labs     22  04222  1043   CREA 1.46 1.69*   BUN 19 18   * 133*   K 4.0 3.5    103   CO2 21 23   AGAP 9 7   * 140*       LFTS:  Recent Labs     22  0429 22  1043   TBILI 2. 0* 2.5*   ALT 32 38   AP 48* 73   TP 5.9* 7.3   ALB 2.3* 3.3*       Microbiology:     All Micro Results     Procedure Component Value Units Date/Time    COVID-19 RAPID TEST [[id number]]     Order Status: Sent     BLOOD CULTURE ID PANEL [[id number]]  (Abnormal) Collected: 01/05/22 1043    Order Status: Completed Specimen: Blood Updated: 01/06/22 0713     Acc. no. from Micro Order [id number]     Staphylococcus Detected        Comment: RESULTS VERIFIED, PHONED TO AND READ BACK BY  BERTO JOSHI @ 0130 1/6/22 MM          Staphylococcus aureus Detected        Comment: RESULTS VERIFIED, PHONED TO AND READ BACK BY  BERTO JOSHI @ 0130 1/6/22 MM          mecA (Methicillin-Resistance Genes) Detected        Comment: Presence of mecA is highly indicative of methicillin resistance. The test does not replace traditional culture and susceptibilities        INTERPRETATION       Gram positive cocci in clusters, identified by realtime PCR as SUSPECTED MRSA. Comment: Recommend IV vancomycin use, unlikely to be a contaminant. Infectious Diseases Consult recommended in adult patients. THIS TEST DOES NOT REPLACE SENSITIVITY TESTING.       BLOOD CULTURE [[id number]] Collected: 01/05/22 1228    Order Status: Completed Specimen: Blood Updated: 01/06/22 0249     Special Requests: --        LEFT  HAND       GRAM STAIN GRAM POSITIVE COCCI               AEROBIC AND ANAEROBIC BOTTLES                  CRITICAL RESULT NOT CALLED DUE TO PREVIOUS NOTIFICATION OF CRITICAL RESULT WITHIN THE LAST 24 HOURS.            Culture result:       CULTURE IN PROGRESS,FURTHER UPDATES TO FOLLOW          BLOOD CULTURE [[id number]] Collected: 01/05/22 1043    Order Status: Completed Specimen: Blood Updated: 01/06/22 0132     Special Requests: --        RIGHT  ARM       GRAM STAIN GRAM POSITIVE COCCI               AEROBIC AND ANAEROBIC BOTTLES                  RESULTS VERIFIED, PHONED TO AND READ BACK BY BERTO JOSHI @ 0130 1/6/22 MM           Culture result: CULTURE IN [facility] UPDATES TO FOLLOW                  Refer to Blood Culture ID Panel Accession  [id number]            Imaging:   Reviewed    Signed By: STEVE Hawthorne     January 6, 2022

## 2022-01-06 NOTE — PROGRESS NOTES
TRANSFER - IN REPORT:    Verbal report received from [facility] on Ashley Brumfield  being received from ER for transfer care to room 920. Report consisted of patients Situation, Background, Assessment and   Recommendations(SBAR). Information from the following report(s) SBAR, MAR, labs  was reviewed with the receiving nurse. Opportunity for questions and clarification was provided. Assessment completed upon patients arrival to unit and care assumed. Pt arrived on unit via [place] accompanied by wife. Is complaining of pain 8/10. Rt arm elevated on pillow with warm compress applied to upper basilic area. IV infusing without difficulty. O2 on at 2 L via n/c. VSS.

## 2022-01-07 ENCOUNTER — APPOINTMENT (OUTPATIENT)
Dept: NON INVASIVE DIAGNOSTICS | Age: 57
DRG: 872 | End: 2022-01-07
Attending: STUDENT IN AN ORGANIZED HEALTH CARE EDUCATION/TRAINING PROGRAM

## 2022-01-07 LAB
ALBUMIN SERPL-MCNC: 2 G/DL (ref 3.5–5)
ALBUMIN/GLOB SERPL: 0.5 {RATIO} (ref 1.2–3.5)
ALP SERPL-CCNC: 46 U/L (ref 50–136)
ALT SERPL-CCNC: 27 U/L (ref 12–65)
ANION GAP SERPL CALC-SCNC: 9 MMOL/L (ref 7–16)
AST SERPL-CCNC: 18 U/L (ref 15–37)
BASOPHILS # BLD: 0 K/UL (ref 0–0.2)
BASOPHILS NFR BLD: 1 % (ref 0–2)
BILIRUB SERPL-MCNC: 0.8 MG/DL (ref 0.2–1.1)
BUN SERPL-MCNC: 17 MG/DL (ref 6–23)
CALCIUM SERPL-MCNC: 8.4 MG/DL (ref 8.3–10.4)
CHLORIDE SERPL-SCNC: 104 MMOL/L (ref 98–107)
CO2 SERPL-SCNC: 21 MMOL/L (ref 21–32)
CREAT SERPL-MCNC: 1.23 MG/DL (ref 0.8–1.5)
DIFFERENTIAL METHOD BLD: ABNORMAL
ECHO AO ASC DIAM: 2.9 CM
ECHO AO ASCENDING AORTA INDEX: 1.31 CM/M2
ECHO AO ROOT DIAM: 3.2 CM
ECHO AO ROOT INDEX: 1.44 CM/M2
ECHO AV AREA PEAK VELOCITY: 2.7 CM2
ECHO AV AREA PEAK VELOCITY: 2.7 CM2
ECHO AV AREA VTI: 2.8 CM2
ECHO AV AREA VTI: 2.8 CM2
ECHO AV MEAN GRADIENT: 5 MMHG
ECHO AV MEAN VELOCITY: 1 M/S
ECHO AV PEAK GRADIENT: 9 MMHG
ECHO AV PEAK VELOCITY: 1.5 M/S
ECHO AV VELOCITY RATIO: 1
ECHO AV VTI: 28.8 CM
ECHO EST RA PRESSURE: 3 MMHG
ECHO LA DIAMETER INDEX: 2.03 CM/M2
ECHO LA DIAMETER: 4.5 CM
ECHO LA TO AORTIC ROOT RATIO: 1.41
ECHO LA VOL 2C: 66 ML (ref 18–58)
ECHO LA VOL 4C: 79 ML (ref 18–58)
ECHO LA VOLUME AREA LENGTH: 77 ML
ECHO LA VOLUME INDEX A2C: 30 ML/M2 (ref 16–34)
ECHO LA VOLUME INDEX A4C: 36 ML/M2 (ref 16–34)
ECHO LA VOLUME INDEX AREA LENGTH: 35 ML/M2 (ref 16–34)
ECHO LV E' LATERAL VELOCITY: 12 CM/S
ECHO LV E' SEPTAL VELOCITY: 9 CM/S
ECHO LV EDV A4C: 136 ML
ECHO LV EDV INDEX A4C: 61 ML/M2
ECHO LV EJECTION FRACTION A4C: 65 %
ECHO LV ESV A4C: 48 ML
ECHO LV ESV INDEX A4C: 22 ML/M2
ECHO LV FRACTIONAL SHORTENING: 32 % (ref 28–44)
ECHO LV INTERNAL DIMENSION DIASTOLE INDEX: 2.25 CM/M2
ECHO LV INTERNAL DIMENSION DIASTOLIC: 5 CM (ref 4.2–5.9)
ECHO LV INTERNAL DIMENSION SYSTOLIC INDEX: 1.53 CM/M2
ECHO LV INTERNAL DIMENSION SYSTOLIC: 3.4 CM
ECHO LV IVSD: 1 CM (ref 0.6–1)
ECHO LV MASS 2D: 182 G (ref 88–224)
ECHO LV MASS INDEX 2D: 82 G/M2 (ref 49–115)
ECHO LV POSTERIOR WALL DIASTOLIC: 1 CM (ref 0.6–1)
ECHO LV RELATIVE WALL THICKNESS RATIO: 0.4
ECHO LVOT AREA: 2.8 CM2
ECHO LVOT AV VTI INDEX: 1.01
ECHO LVOT DIAM: 1.9 CM
ECHO LVOT MEAN GRADIENT: 4 MMHG
ECHO LVOT PEAK GRADIENT: 9 MMHG
ECHO LVOT PEAK VELOCITY: 1.5 M/S
ECHO LVOT STROKE VOLUME INDEX: 37.1 ML/M2
ECHO LVOT SV: 82.5 ML
ECHO LVOT VTI: 29.1 CM
ECHO MV A VELOCITY: 0.51 M/S
ECHO MV AREA VTI: 2.9 CM2
ECHO MV E DECELERATION TIME (DT): 221.8 MS
ECHO MV E VELOCITY: 0.79 M/S
ECHO MV E/A RATIO: 1.55
ECHO MV E/E' LATERAL: 6.58
ECHO MV E/E' RATIO (AVERAGED): 7.68
ECHO MV E/E' SEPTAL: 8.78
ECHO MV LVOT VTI INDEX: 0.98
ECHO MV MAX VELOCITY: 1.1 M/S
ECHO MV MEAN GRADIENT: 2 MMHG
ECHO MV MEAN VELOCITY: 0.6 M/S
ECHO MV PEAK GRADIENT: 5 MMHG
ECHO MV VTI: 28.6 CM
ECHO PV ACCELERATION TIME (AT): 99.9 MS
ECHO PV MAX VELOCITY: 1.2 M/S
ECHO PV PEAK GRADIENT: 6 MMHG
ECHO RV FREE WALL PEAK S': 17 CM/S
ECHO RV INTERNAL DIMENSION: 2.9 CM
ECHO RV TAPSE: 2.1 CM (ref 1.5–2)
EOSINOPHIL # BLD: 0 K/UL (ref 0–0.8)
EOSINOPHIL NFR BLD: 1 % (ref 0.5–7.8)
ERYTHROCYTE [DISTWIDTH] IN BLOOD BY AUTOMATED COUNT: 14.3 % (ref 11.9–14.6)
GLOBULIN SER CALC-MCNC: 3.7 G/DL (ref 2.3–3.5)
GLUCOSE SERPL-MCNC: 90 MG/DL (ref 65–100)
HCT VFR BLD AUTO: 33.5 % (ref 41.1–50.3)
HGB BLD-MCNC: 10.7 G/DL (ref 13.6–17.2)
IMM GRANULOCYTES # BLD AUTO: 0 K/UL (ref 0–0.5)
IMM GRANULOCYTES NFR BLD AUTO: 1 % (ref 0–5)
LYMPHOCYTES # BLD: 0.7 K/UL (ref 0.5–4.6)
LYMPHOCYTES NFR BLD: 15 % (ref 13–44)
MCH RBC QN AUTO: 27 PG (ref 26.1–32.9)
MCHC RBC AUTO-ENTMCNC: 31.9 G/DL (ref 31.4–35)
MCV RBC AUTO: 84.6 FL (ref 79.6–97.8)
MONOCYTES # BLD: 0.5 K/UL (ref 0.1–1.3)
MONOCYTES NFR BLD: 11 % (ref 4–12)
NEUTS SEG # BLD: 3.2 K/UL (ref 1.7–8.2)
NEUTS SEG NFR BLD: 71 % (ref 43–78)
NRBC # BLD: 0 K/UL (ref 0–0.2)
PLATELET # BLD AUTO: 98 K/UL (ref 150–450)
PLATELET COMMENTS,PCOM: ABNORMAL
PMV BLD AUTO: 11.2 FL (ref 9.4–12.3)
POTASSIUM SERPL-SCNC: 3.8 MMOL/L (ref 3.5–5.1)
PROT SERPL-MCNC: 5.7 G/DL (ref 6.3–8.2)
RBC # BLD AUTO: 3.96 M/UL (ref 4.23–5.6)
RBC MORPH BLD: ABNORMAL
SODIUM SERPL-SCNC: 134 MMOL/L (ref 136–145)
TROPONIN-HIGH SENSITIVITY: 18.7 PG/ML (ref 0–14)
VANCOMYCIN SERPL-MCNC: 7.6 UG/ML
WBC # BLD AUTO: 4.4 K/UL (ref 4.3–11.1)
WBC MORPH BLD: ABNORMAL

## 2022-01-07 PROCEDURE — 74011250636 HC RX REV CODE- 250/636: Performed by: INTERNAL MEDICINE

## 2022-01-07 PROCEDURE — 74011000250 HC RX REV CODE- 250: Performed by: INTERNAL MEDICINE

## 2022-01-07 PROCEDURE — 74011250637 HC RX REV CODE- 250/637: Performed by: STUDENT IN AN ORGANIZED HEALTH CARE EDUCATION/TRAINING PROGRAM

## 2022-01-07 PROCEDURE — 2709999900 HC NON-CHARGEABLE SUPPLY

## 2022-01-07 PROCEDURE — 74011000250 HC RX REV CODE- 250: Performed by: EMERGENCY MEDICINE

## 2022-01-07 PROCEDURE — 77010033678 HC OXYGEN DAILY

## 2022-01-07 PROCEDURE — 74011250637 HC RX REV CODE- 250/637: Performed by: INTERNAL MEDICINE

## 2022-01-07 PROCEDURE — 84484 ASSAY OF TROPONIN QUANT: CPT

## 2022-01-07 PROCEDURE — 74011250636 HC RX REV CODE- 250/636: Performed by: STUDENT IN AN ORGANIZED HEALTH CARE EDUCATION/TRAINING PROGRAM

## 2022-01-07 PROCEDURE — 36415 COLL VENOUS BLD VENIPUNCTURE: CPT

## 2022-01-07 PROCEDURE — 65270000029 HC RM PRIVATE

## 2022-01-07 PROCEDURE — 74011000250 HC RX REV CODE- 250: Performed by: STUDENT IN AN ORGANIZED HEALTH CARE EDUCATION/TRAINING PROGRAM

## 2022-01-07 PROCEDURE — 85025 COMPLETE CBC W/AUTO DIFF WBC: CPT

## 2022-01-07 PROCEDURE — C8929 TTE W OR WO FOL WCON,DOPPLER: HCPCS

## 2022-01-07 PROCEDURE — 74011250637 HC RX REV CODE- 250/637: Performed by: EMERGENCY MEDICINE

## 2022-01-07 PROCEDURE — 74011250636 HC RX REV CODE- 250/636: Performed by: EMERGENCY MEDICINE

## 2022-01-07 PROCEDURE — 80053 COMPREHEN METABOLIC PANEL: CPT

## 2022-01-07 PROCEDURE — 80202 ASSAY OF VANCOMYCIN: CPT

## 2022-01-07 RX ORDER — HYDROMORPHONE HYDROCHLORIDE 1 MG/ML
1 INJECTION, SOLUTION INTRAMUSCULAR; INTRAVENOUS; SUBCUTANEOUS
Status: DISCONTINUED | OUTPATIENT
Start: 2022-01-07 | End: 2022-01-12 | Stop reason: HOSPADM

## 2022-01-07 RX ORDER — OXYCODONE HYDROCHLORIDE 5 MG/1
10 TABLET ORAL
Status: DISCONTINUED | OUTPATIENT
Start: 2022-01-07 | End: 2022-01-12 | Stop reason: HOSPADM

## 2022-01-07 RX ADMIN — SODIUM CHLORIDE, PRESERVATIVE FREE 10 ML: 5 INJECTION INTRAVENOUS at 06:16

## 2022-01-07 RX ADMIN — VANCOMYCIN HYDROCHLORIDE 1250 MG: 10 INJECTION, POWDER, LYOPHILIZED, FOR SOLUTION INTRAVENOUS at 03:18

## 2022-01-07 RX ADMIN — SODIUM CHLORIDE, PRESERVATIVE FREE 10 ML: 5 INJECTION INTRAVENOUS at 06:15

## 2022-01-07 RX ADMIN — DIPHENHYDRAMINE HYDROCHLORIDE 25 MG: 25 CAPSULE ORAL at 22:02

## 2022-01-07 RX ADMIN — HYDROCODONE BITARTRATE AND ACETAMINOPHEN 1 TABLET: 5; 325 TABLET ORAL at 08:00

## 2022-01-07 RX ADMIN — SODIUM CHLORIDE, PRESERVATIVE FREE 10 ML: 5 INJECTION INTRAVENOUS at 13:21

## 2022-01-07 RX ADMIN — SODIUM CHLORIDE 150 ML/HR: 900 INJECTION, SOLUTION INTRAVENOUS at 06:15

## 2022-01-07 RX ADMIN — Medication 5 MG: at 22:01

## 2022-01-07 RX ADMIN — ENOXAPARIN SODIUM 40 MG: 100 INJECTION SUBCUTANEOUS at 08:01

## 2022-01-07 RX ADMIN — HYDROMORPHONE HYDROCHLORIDE 0.2 MG: 1 INJECTION, SOLUTION INTRAMUSCULAR; INTRAVENOUS; SUBCUTANEOUS at 03:17

## 2022-01-07 RX ADMIN — HYDROMORPHONE HYDROCHLORIDE 1 MG: 1 INJECTION, SOLUTION INTRAMUSCULAR; INTRAVENOUS; SUBCUTANEOUS at 13:16

## 2022-01-07 RX ADMIN — SODIUM CHLORIDE, PRESERVATIVE FREE 10 ML: 5 INJECTION INTRAVENOUS at 22:02

## 2022-01-07 RX ADMIN — Medication: at 09:00

## 2022-01-07 RX ADMIN — HYDROMORPHONE HYDROCHLORIDE 1 MG: 1 INJECTION, SOLUTION INTRAMUSCULAR; INTRAVENOUS; SUBCUTANEOUS at 19:58

## 2022-01-07 RX ADMIN — PERFLUTREN 3 ML: 6.52 INJECTION, SUSPENSION INTRAVENOUS at 11:55

## 2022-01-07 RX ADMIN — OXYCODONE 10 MG: 5 TABLET ORAL at 17:15

## 2022-01-07 RX ADMIN — VANCOMYCIN HYDROCHLORIDE 1250 MG: 10 INJECTION, POWDER, LYOPHILIZED, FOR SOLUTION INTRAVENOUS at 21:16

## 2022-01-07 RX ADMIN — OXYCODONE 10 MG: 5 TABLET ORAL at 23:36

## 2022-01-07 NOTE — PROGRESS NOTES
Hourly rounding completed on this shift. All needs met. PRN pain medication given per MAR. Pt is currently resting in bed with spouse at bedside. Will give report to oncoming nurse.

## 2022-01-07 NOTE — PROGRESS NOTES
Pt complained of  7/10 pain reported to NP, new orders received for norco. Telemetry monitored placed and confirmed with [facility] in telemetry room. Hourly rounds performed through shift, pt denies needs at this time. Bed in low position, locked and call light/personal items within reach. Will continue to monitor and report to night shift nurse.

## 2022-01-07 NOTE — PROGRESS NOTES
Infectious Disease Consult    Today's Date: 1/7/2022   Admit Date: 1/5/2022    Impression:   · Staph aureus and CoNS bacteremia (1/5) secondary to skin and soft tissue infection; TTE negative  · RUE cellulitis  · Right basilic vein thrombosis  · Septic pulmonary emboli    Plan:     · Continue IV Vancomycin, blood cultures are growing staph aureus and CoNS. *It's mecA+ but this may be from the CoNS so we'll have to wait for blood culture phenotypic susceptibility testing. · He will likely require 6 week duration of antibiotics. · Repeat blood cultures tomorrow am.    Anti-infectives:   · Clinda 1/5  · Zosyn 1/5-1/6  · Vanc 1/5-    Subjective: Interval History: Seen resting in bed with family at bedside. Ongoing complaints of chest pain with breathing- reports no resolution despite IV pain medications. R arm with no significant improvement. Patient is a 64 y.o. male who presented to ED on 1/5/22 with pain and swelling to right elbow. 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 He reported that evening he began itching and noticed 2 lesions appearing on his right elbow. Since then he has had increasing pain and erythema as well as fever. CRP 26, Tmax 101.7, procal 2.3. CT of RUE with findings consistent with cellulitis without abscess. US revealed complete thrombosis of his right basilic vein. He was started on Vanc/Zosyn. Blood cultures obtained now + for CoNS, ID as staph. ID has been consulted for assistance. He has no PMH and NKA. Today he is seen on supplemental O2 and reports chest pain. No Known Allergies     Review of Systems:  A comprehensive review of systems was negative except for that written in the History of Present Illness.     Objective:     Visit Vitals  /83 (BP 1 Location: Left upper arm, BP Patient Position: Sitting)   Pulse 76   Temp 97.8 °F (36.6 °C)   Resp 20   Ht 6' (1.829 m)   Wt 99.8 kg (220 lb 0.3 oz)   SpO2 100%   BMI 29.84 kg/m²     Temp (24hrs), Av.5 °F (36.9 °C), Min:97.8 °F (36.6 °C), Max:100 °F (37.8 °C)     Patient was seen and examined on 2022 and physical exam remains unchanged since yesterday, unless noted below:    Lines:  Peripheral IV:       Physical Exam:    General:  Alert, cooperative, well noursished, well developed, appears stated age   Eyes:  Sclera anicteric. Pupils equally round and reactive to light. Mouth/Throat: Mucous membranes normal, oral pharynx clear   Neck: Supple   Lungs:   Clear to auscultation bilaterally, good effort; on O2 via NC   CV:  Regular rate and rhythm,no murmur, click, rub or gallop   Abdomen:   Soft, non-tender. bowel sounds normal. non-distended   Extremities: R elbow erythema and induration noted (see photo below)   Skin: Skin color, texture, turgor normal. no acute rash or lesions   Lymph nodes: Cervical and supraclavicular normal   Musculoskeletal: No swelling or deformity   Lines/Devices:  Intact, no erythema, drainage or tenderness   Psych: Alert and oriented, normal mood affect given the setting           Data Review:     CBC:  Recent Labs     22  0643 22  04222  1043 22  1043   WBC 4.4 4.9  --  8.1   GRANS 71 82*   < > 91*   MONOS 11 9   < > 4   EOS 1 0*   < > 0*   ANEU 3.2 4.0   < > 7.4   ABL 0.7 0.4*   < > 0.3*   HGB 10.7* 11.4*  --  13.5*   HCT 33.5* 34.6*  --  40.6*   PLT 98* 94*  --  121*    < > = values in this interval not displayed.        BMP:  Recent Labs     22  0643 22  04222  1043   CREA 1.23 1.46 1.69*   BUN 17 19 18   * 133* 133*   K 3.8 4.0 3.5    103 103   CO2 21 21 23   AGAP 9 9 7   GLU 90 110* 140*       LFTS:  Recent Labs     22  0643 22  0429 22  1043   TBILI 0.8 2.0* 2.5*   ALT 27 32 38   AP 46* 48* 73   TP 5.7* 5.9* 7.3   ALB 2.0* 2.3* 3.3*       Microbiology:     All Micro Results     Procedure Component Value Units Date/Time    BLOOD CULTURE [[id number]]  (Abnormal) Collected: 22 1228    Order Status: Completed Specimen: Blood Updated: 01/07/22 0654     Special Requests: --        LEFT  HAND       GRAM STAIN GRAM POSITIVE COCCI               AEROBIC AND ANAEROBIC BOTTLES                  CRITICAL RESULT NOT CALLED DUE TO PREVIOUS NOTIFICATION OF CRITICAL RESULT WITHIN THE LAST 24 HOURS. Culture result: STAPHYLOCOCCUS AUREUS               CULTURE IN PROGRESS,FURTHER UPDATES TO FOLLOW          BLOOD CULTURE [[id number]]  (Abnormal) Collected: 01/05/22 1043    Order Status: Completed Specimen: Blood Updated: 01/07/22 0653     Special Requests: --        RIGHT  ARM       GRAM STAIN GRAM POSITIVE COCCI               AEROBIC AND ANAEROBIC BOTTLES                  RESULTS VERIFIED, PHONED TO AND READ BACK BY BERTO JOSHI @ 0130 1/6/22 MM           Culture result:       STAPHYLOCOCCUS AUREUS SENSITIVITY TO FOLLOW                  Refer to Blood Culture ID Panel Accession  [id number]      COVID-19 RAPID TEST [[id number]] Collected: 01/06/22 0859    Order Status: Completed Specimen: Nasopharyngeal Updated: 01/06/22 0932     Specimen source NASAL        COVID-19 rapid test Not detected        Comment:      The specimen is NEGATIVE for SARS-CoV-2, the novel coronavirus associated with COVID-19. A negative result does not rule out COVID-19. This test has been authorized by the FDA under an Emergency Use Authorization (EUA) for use by authorized laboratories.         Fact sheet for Healthcare Providers: ConventionUpdate.co.nz  Fact sheet for Patients: ConventionUpdate.co.nz       Methodology: Isothermal Nucleic Acid Amplification         BLOOD CULTURE ID PANEL [[id number]]  (Abnormal) Collected: 01/05/22 1043    Order Status: Completed Specimen: Blood Updated: 01/06/22 0713     Acc. no. from Micro Order [id number]     Staphylococcus Detected        Comment: RESULTS VERIFIED, PHONED TO AND READ BACK BY  BERTO JOSHI @ 0130 1/6/22 MM          Staphylococcus aureus Detected Comment: RESULTS VERIFIED, PHONED TO AND READ BACK BY  MADELYNRN @ 0130 1/6/22 MM          mecA (Methicillin-Resistance Genes) Detected        Comment: Presence of mecA is highly indicative of methicillin resistance. The test does not replace traditional culture and susceptibilities        INTERPRETATION       Gram positive cocci in clusters, identified by realtime PCR as SUSPECTED MRSA. Comment: Recommend IV vancomycin use, unlikely to be a contaminant. Infectious Diseases Consult recommended in adult patients. THIS TEST DOES NOT REPLACE SENSITIVITY TESTING.              Imaging:   Reviewed    Signed By: STEVE Mcguire     January 7, 2022

## 2022-01-07 NOTE — PROGRESS NOTES
Hospitalist Progress Note   Admit Date:  2022 11:17 AM   Name:  Kaycee Garcia   Age:  64 y.o. Sex:  male  :  1965   MRN:  [id number]   Room:  [place] [address]    Presenting Complaint: Blood infection    Reason(s) for Admission: Cellulitis [L03.90]     Hospital Course & Interval History:     Mr. Onur Adkins is a 65 yo male without PMH admitted with RUE cellulitis. He is found with s aureus bacteremia and RUE basilic thrombus. CTA chest shows septic thrombus. He is on prophylactic lovenox. He is on IV vancomycin. TTE pending. ID following. He had PETER that resolved with hydration. discharge plans pending. Subjective (22):       Has pain and edema RUE, some pain into chest, ate ok, no bowel changes, no leg edema     Assessment & Plan:     Principal Problem:    Sepsis ([address] (2022)  Active Problems:    Cellulitis (2022)   Bacteremia due to Gram-positive bacteria (2022)  · Continued IV vancomycin  · followup ECHO  · ID appreciated          Septic embolism ([address] (3/1/3453)    Basilic vein thrombosis (2022)  · Continued lovenox        Chest pain (2022)   pleuritic pain  · Adjust dilaudid dose and add as needed oral oxycodone  · Wean O2 as tolerant           PETER (acute kidney injury) ([address] (2022)  ·   Resolved  · Stop IVF          Hyponatremia (2022)  · Trend with BMP  · Stop IVF       Anemia:  Thrombocytopenia:  · Trend CBC      Elevated total bilirubin:  · Resolved         Dispo/Discharge Planning:    Pending     Diet:  ADULT DIET Regular  DVT PPx:lovenox  Code status: Full Code    Hospital Problems as of 2022 Never Reviewed          Codes Class Noted - Resolved POA    Bacteremia due to Gram-positive bacteria ICD-10-CM: R78.81  ICD-9-CM: 790.7  2022 - Present Unknown        Septic embolism ([address] ICD-10-CM: I95  ICD-9-CM: 415.12  2022 - Present Unknown        Cellulitis ICD-10-CM: L03.90  ICD-9-CM: 682.9  2022 - Present Unknown        * (Principal) Sepsis [facility]) ICD-10-CM: A41.9  ICD-9-CM: 038.9, 995.91  1/5/2022 - Present Unknown        Basilic vein thrombosis XXX-10-OC: I82.619  ICD-9-CM: 453.81  1/5/2022 - Present Unknown        Cough ICD-10-CM: R05.9  ICD-9-CM: 786.2  1/5/2022 - Present Unknown        Chest pain ICD-10-CM: R07.9  ICD-9-CM: 786.50  1/5/2022 - Present Unknown        PETER (acute kidney injury) [facility]) ICD-10-CM: N17.9  ICD-9-CM: 584.9  1/5/2022 - Present Unknown        Hyponatremia ICD-10-CM: E87.1  ICD-9-CM: 276.1  1/5/2022 - Present Unknown              Objective:     Patient Vitals for the past 24 hrs:   Temp Pulse Resp BP SpO2   01/07/22 1516 98 °F (36.7 °C) 81 20 129/77 99 %   01/07/22 1200 97.8 °F (36.6 °C) 76 20 129/83 100 %   01/07/22 0717 98.1 °F (36.7 °C) 86 22 136/84 98 %   01/07/22 0325 98.5 °F (36.9 °C) 80 18 119/78 99 %   01/06/22 2330 100 °F (37.8 °C) 89 18 (!) 148/84 95 %   01/06/22 2102 99 °F (37.2 °C) 90 18 127/70 96 %   01/06/22 1838  84        Oxygen Therapy  O2 Sat (%): 99 % (01/07/22 1516)  Pulse via Oximetry: 86 beats per minute (01/06/22 0000)  O2 Device: Nasal cannula (01/07/22 1516)  O2 Flow Rate (L/min): 2 l/min (01/07/22 1516)    Estimated body mass index is 29.84 kg/m² as calculated from the following:    Height as of this encounter: 6' (1.829 m). Weight as of this encounter: 99.8 kg (220 lb 0.3 oz). Intake/Output Summary (Last 24 hours) at 1/7/2022 1709  Last data filed at 1/7/2022 0846  Gross per 24 hour   Intake 2125 ml   Output 300 ml   Net 1825 ml         Physical Exam:     Blood pressure 129/77, pulse 81, temperature 98 °F (36.7 °C), resp. rate 20, height 6' (1.829 m), weight 99.8 kg (220 lb 0.3 oz), SpO2 99 %. General:    Well nourished. No overt distress  CV:   RRR. No m/r/g. No jugular venous distension. No edema  LE   Lungs:   CTAB. No wheezing, rhonchi, or rales. Respirations even, unlabored anterior   Abdomen: Bowel sounds present. Soft, nontender, nondistended.   Extremities: Edematous RUE  Skin:     No rashes and normal coloration. Warm and dry. Neuro:  grossly intact. Psych:  Normal mood and affect. I have reviewed ordered lab tests and independently visualized imaging below:    Recent Labs:  Recent Results (from the past 48 hour(s))   TROPONIN-HIGH SENSITIVITY    Collection Time: 01/05/22 11:26 PM   Result Value Ref Range    Troponin-High Sensitivity 35.7 (H) 0 - 14 pg/mL   METABOLIC PANEL, COMPREHENSIVE    Collection Time: 01/06/22  4:29 AM   Result Value Ref Range    Sodium 133 (L) 136 - 145 mmol/L    Potassium 4.0 3.5 - 5.1 mmol/L    Chloride 103 98 - 107 mmol/L    CO2 21 21 - 32 mmol/L    Anion gap 9 7 - 16 mmol/L    Glucose 110 (H) 65 - 100 mg/dL    BUN 19 6 - 23 MG/DL    Creatinine 1.46 0.8 - 1.5 MG/DL    GFR est AA >60 >60 ml/min/1.73m2    GFR est non-AA 53 (L) >60 ml/min/1.73m2    Calcium 8.3 8.3 - 10.4 MG/DL    Bilirubin, total 2.0 (H) 0.2 - 1.1 MG/DL    ALT (SGPT) 32 12 - 65 U/L    AST (SGOT) 26 15 - 37 U/L    Alk.  phosphatase 48 (L) 50 - 136 U/L    Protein, total 5.9 (L) 6.3 - 8.2 g/dL    Albumin 2.3 (L) 3.5 - 5.0 g/dL    Globulin 3.6 (H) 2.3 - 3.5 g/dL    A-G Ratio 0.6 (L) 1.2 - 3.5     MAGNESIUM    Collection Time: 01/06/22  4:29 AM   Result Value Ref Range    Magnesium 2.6 (H) 1.8 - 2.4 mg/dL   LACTIC ACID    Collection Time: 01/06/22  4:29 AM   Result Value Ref Range    Lactic acid 1.1 0.4 - 2.0 MMOL/L   CBC WITH AUTOMATED DIFF    Collection Time: 01/06/22  4:29 AM   Result Value Ref Range    WBC 4.9 4.3 - 11.1 K/uL    RBC 3.99 (L) 4.23 - 5.6 M/uL    HGB 11.4 (L) 13.6 - 17.2 g/dL    HCT 34.6 (L) 41.1 - 50.3 %    MCV 86.7 79.6 - 97.8 FL    MCH 28.6 26.1 - 32.9 PG    MCHC 32.9 31.4 - 35.0 g/dL    RDW 14.1 11.9 - 14.6 %    PLATELET 94 (L) 287 - 450 K/uL    MPV 11.4 9.4 - 12.3 FL    ABSOLUTE NRBC 0.00 0.0 - 0.2 K/uL    DF AUTOMATED      NEUTROPHILS 82 (H) 43 - 78 %    LYMPHOCYTES 9 (L) 13 - 44 %    MONOCYTES 9 4.0 - 12.0 %    EOSINOPHILS 0 (L) 0.5 - 7.8 %    BASOPHILS 1 0.0 - 2.0 %    IMMATURE GRANULOCYTES 0 0.0 - 5.0 %    ABS. NEUTROPHILS 4.0 1.7 - 8.2 K/UL    ABS. LYMPHOCYTES 0.4 (L) 0.5 - 4.6 K/UL    ABS. MONOCYTES 0.4 0.1 - 1.3 K/UL    ABS. EOSINOPHILS 0.0 0.0 - 0.8 K/UL    ABS. BASOPHILS 0.0 0.0 - 0.2 K/UL    ABS. IMM. GRANS. 0.0 0.0 - 0.5 K/UL   VANCOMYCIN, RANDOM    Collection Time: 01/06/22  4:29 AM   Result Value Ref Range    Vancomycin, random 6.4 UG/ML   PROCALCITONIN    Collection Time: 01/06/22  4:29 AM   Result Value Ref Range    Procalcitonin 6.43 (H) 0.00 - 0.49 ng/mL   C REACTIVE PROTEIN, QT    Collection Time: 01/06/22  4:29 AM   Result Value Ref Range    C-Reactive protein 25.9 (H) 0.0 - 0.9 mg/dL   COVID-19 RAPID TEST    Collection Time: 01/06/22  8:59 AM   Result Value Ref Range    Specimen source NASAL      COVID-19 rapid test Not detected NOTD     HIV 1/2 AG/AB, 4TH GENERATION,W RFLX CONFIRM    Collection Time: 01/06/22 12:16 PM   Result Value Ref Range    HIV 1/2 Interpretation NONREACTIVE NR      HIV 1/2 result comment SEE NOTE (A) NR     VANCOMYCIN, RANDOM    Collection Time: 01/07/22 12:47 AM   Result Value Ref Range    Vancomycin, random 7.6 UG/ML   TROPONIN-HIGH SENSITIVITY    Collection Time: 01/07/22 12:47 AM   Result Value Ref Range    Troponin-High Sensitivity 18.7 (H) 0 - 14 pg/mL   METABOLIC PANEL, COMPREHENSIVE    Collection Time: 01/07/22  6:43 AM   Result Value Ref Range    Sodium 134 (L) 136 - 145 mmol/L    Potassium 3.8 3.5 - 5.1 mmol/L    Chloride 104 98 - 107 mmol/L    CO2 21 21 - 32 mmol/L    Anion gap 9 7 - 16 mmol/L    Glucose 90 65 - 100 mg/dL    BUN 17 6 - 23 MG/DL    Creatinine 1.23 0.8 - 1.5 MG/DL    GFR est AA >60 >60 ml/min/1.73m2    GFR est non-AA >60 >60 ml/min/1.73m2    Calcium 8.4 8.3 - 10.4 MG/DL    Bilirubin, total 0.8 0.2 - 1.1 MG/DL    ALT (SGPT) 27 12 - 65 U/L    AST (SGOT) 18 15 - 37 U/L    Alk.  phosphatase 46 (L) 50 - 136 U/L    Protein, total 5.7 (L) 6.3 - 8.2 g/dL    Albumin 2.0 (L) 3.5 - 5.0 g/dL    Globulin 3.7 (H) 2.3 - 3.5 g/dL    A-G Ratio 0.5 (L) 1.2 - 3.5     CBC WITH AUTOMATED DIFF    Collection Time: 01/07/22  6:43 AM   Result Value Ref Range    WBC 4.4 4.3 - 11.1 K/uL    RBC 3.96 (L) 4.23 - 5.6 M/uL    HGB 10.7 (L) 13.6 - 17.2 g/dL    HCT 33.5 (L) 41.1 - 50.3 %    MCV 84.6 79.6 - 97.8 FL    MCH 27.0 26.1 - 32.9 PG    MCHC 31.9 31.4 - 35.0 g/dL    RDW 14.3 11.9 - 14.6 %    PLATELET 98 (L) 324 - 450 K/uL    MPV 11.2 9.4 - 12.3 FL    ABSOLUTE NRBC 0.00 0.0 - 0.2 K/uL    NEUTROPHILS 71 43 - 78 %    LYMPHOCYTES 15 13 - 44 %    MONOCYTES 11 4.0 - 12.0 %    EOSINOPHILS 1 0.5 - 7.8 %    BASOPHILS 1 0.0 - 2.0 %    IMMATURE GRANULOCYTES 1 0.0 - 5.0 %    ABS. NEUTROPHILS 3.2 1.7 - 8.2 K/UL    ABS. LYMPHOCYTES 0.7 0.5 - 4.6 K/UL    ABS. MONOCYTES 0.5 0.1 - 1.3 K/UL    ABS. EOSINOPHILS 0.0 0.0 - 0.8 K/UL    ABS. BASOPHILS 0.0 0.0 - 0.2 K/UL    ABS. IMM.  GRANS. 0.0 0.0 - 0.5 K/UL    RBC COMMENTS NORMOCYTIC/NORMOCHROMIC      WBC COMMENTS Result Confirmed By Smear      PLATELET COMMENTS DECREASED      DF AUTOMATED     ECHO ADULT COMPLETE    Collection Time: 01/07/22 11:05 AM   Result Value Ref Range    IVSd 1.0 0.6 - 1.0 cm    LVIDd 5.0 4.2 - 5.9 cm    LVIDs 3.4 cm    LVOT Diameter 1.9 cm    LVPWd 1.0 0.6 - 1.0 cm    LVOT SV 82.5 ml    LV Ejection Fraction A4C 65 %    LV EDV A4C 136 mL    LV ESV A4C 48 mL    LVOT Peak Gradient 9 mmHg    LVOT Mean Gradient 4 mmHg    LVOT Peak Velocity 1.5 m/s    LVOT VTI 29.1 cm    RVIDd 2.9 cm    RV Free Wall Peak S' 17 cm/s    LA Diameter 4.5 cm    LA Volume A/L 77 mL    LA Volume 2C 66 (A) 18 - 58 mL    LA Volume 4C 79 (A) 18 - 58 mL    Est. RA Pressure 3 mmHg    AV Area by Peak Velocity 2.7 cm2    AV Area by Peak Velocity 2.7 cm2    AV Area by VTI 2.8 cm2    AV Area by VTI 2.8 cm2    AV Peak Gradient 9 mmHg    AV Mean Gradient 5 mmHg    AV Peak Velocity 1.5 m/s    AV Mean Velocity 1.0 m/s    AV VTI 28.8 cm    MV A Velocity 0.51 m/s    MV E Wave Deceleration Time 221.8 ms    MV E Velocity 0.79 m/s LV E' Lateral Velocity 12 cm/s    LV E' Septal Velocity 9 cm/s    MV Area by VTI 2.9 cm2    MV Peak Gradient 5 mmHg    MV Mean Gradient 2 mmHg    MV Max Velocity 1.1 m/s    MV Mean Velocity 0.6 m/s    MV VTI 28.6 cm    PV AT 99.9 ms    PV Peak Gradient 6 mmHg    PV Max Velocity 1.2 m/s    TAPSE 2.1 (A) 1.5 - 2.0 cm    Ascending Aorta 2.9 cm    Aortic Root 3.2 cm    Fractional Shortening 2D 32 28 - 44 %    LV ESV Index A4C 22 mL/m2    LV EDV Index A4C 61 mL/m2    LVIDd Index 2.25 cm/m2    LVIDs Index 1.53 cm/m2    LV RWT Ratio 0.40     LV Mass 2D 182.0 88 - 224 g    LV Mass 2D Index 82.0 49 - 115 g/m2    MV E/A 1.55     E/E' Ratio (Averaged) 7.68     E/E' Lateral 6.58     E/E' Septal 8.78     LA Volume Index A/L 35 16 - 34 mL/m2    LVOT Stroke Volume Index 37.1 mL/m2    LVOT Area 2.8 cm2    LA Volume Index 2C 30 16 - 34 mL/m2    LA Volume Index 4C 36 (A) 16 - 34 mL/m2    LA Size Index 2.03 cm/m2    LA/AO Root Ratio 1.41     Ao Root Index 1.44 cm/m2    Ascending Aorta Index 1.31 cm/m2    AV Velocity Ratio 1.00     LVOT:AV VTI Index 1.01     MV:LVOT VTI Index 0.98        All Micro Results     Procedure Component Value Units Date/Time    BLOOD CULTURE [[id number]]  (Abnormal) Collected: 01/05/22 1228    Order Status: Completed Specimen: Blood Updated: 01/07/22 0654     Special Requests: --        LEFT  HAND       GRAM STAIN GRAM POSITIVE COCCI               AEROBIC AND ANAEROBIC BOTTLES                  CRITICAL RESULT NOT CALLED DUE TO PREVIOUS NOTIFICATION OF CRITICAL RESULT WITHIN THE LAST 24 HOURS.            Culture result: STAPHYLOCOCCUS AUREUS               CULTURE IN PROGRESS,FURTHER UPDATES TO FOLLOW          BLOOD CULTURE [[id number]]  (Abnormal) Collected: 01/05/22 1043    Order Status: Completed Specimen: Blood Updated: 01/07/22 0653     Special Requests: --        RIGHT  ARM       GRAM STAIN GRAM POSITIVE COCCI               AEROBIC AND ANAEROBIC BOTTLES                  RESULTS VERIFIED, PHONED TO AND READ BACK BY BERTO JOSHI @ 0130 1/6/22 MM           Culture result:       STAPHYLOCOCCUS AUREUS SENSITIVITY TO FOLLOW                  Refer to Blood Culture ID Panel Accession  [id number]      COVID-19 RAPID TEST [[id number]] Collected: 01/06/22 0859    Order Status: Completed Specimen: Nasopharyngeal Updated: 01/06/22 0932     Specimen source NASAL        COVID-19 rapid test Not detected        Comment:      The specimen is NEGATIVE for SARS-CoV-2, the novel coronavirus associated with COVID-19. A negative result does not rule out COVID-19. This test has been authorized by the FDA under an Emergency Use Authorization (EUA) for use by authorized laboratories. Fact sheet for Healthcare Providers: [employer]  Fact sheet for Patients: [employer]       Methodology: Isothermal Nucleic Acid Amplification         BLOOD CULTURE ID PANEL [[id number]]  (Abnormal) Collected: 01/05/22 1043    Order Status: Completed Specimen: Blood Updated: 01/06/22 0713     Acc. no. from Micro Order [id number]     Staphylococcus Detected        Comment: RESULTS VERIFIED, PHONED TO AND READ BACK BY  BERTO JOSHI @ 0130 1/6/22 MM          Staphylococcus aureus Detected        Comment: RESULTS VERIFIED, PHONED TO AND READ BACK BY  BERTO JOSHI @ 0130 1/6/22 MM          mecA (Methicillin-Resistance Genes) Detected        Comment: Presence of mecA is highly indicative of methicillin resistance. The test does not replace traditional culture and susceptibilities        INTERPRETATION       Gram positive cocci in clusters, identified by realtime PCR as SUSPECTED MRSA. Comment: Recommend IV vancomycin use, unlikely to be a contaminant. Infectious Diseases Consult recommended in adult patients. THIS TEST DOES NOT REPLACE SENSITIVITY TESTING.              Other Studies:  ECHO ADULT COMPLETE    Result Date: 1/7/2022    Left Ventricle: Left ventricle size is normal. Mildly increased wall thickness. Normal wall motion. Normal left ventricular systolic function with a visually estimated EF of 60 - 65%. Normal diastolic function.   Left Atrium: Left atrium is mildly dilated.   Contrast used: Definity. Current Meds:  Current Facility-Administered Medications   Medication Dose Route Frequency    HYDROmorphone (DILAUDID) injection 1 mg  1 mg IntraVENous Q4H PRN    oxyCODONE IR (ROXICODONE) tablet 10 mg  10 mg Oral Q4H PRN    influenza vaccine 2021-22 (6 mos+)(PF) (FLUARIX/FLULAVAL/FLUZONE QUAD) injection 0.5 mL  1 Each IntraMUSCular PRIOR TO DISCHARGE    vancomycin (VANCOCIN) 1250 mg in  ml infusion  1,250 mg IntraVENous Q18H    pantothenic ac-min oil-pet,hyd (AQUAPHOR) 41 % ointment   Topical DAILY    melatonin tablet 5 mg  5 mg Oral QHS    diphenhydrAMINE (BENADRYL) capsule 25 mg  25 mg Oral QHS PRN    sodium chloride (NS) flush 5-10 mL  5-10 mL IntraVENous Q8H    sodium chloride (NS) flush 5-10 mL  5-10 mL IntraVENous PRN    sodium chloride (NS) flush 5-40 mL  5-40 mL IntraVENous Q8H    sodium chloride (NS) flush 5-40 mL  5-40 mL IntraVENous PRN    acetaminophen (TYLENOL) tablet 650 mg  650 mg Oral Q6H PRN    Or    acetaminophen (TYLENOL) suppository 650 mg  650 mg Rectal Q6H PRN    polyethylene glycol (MIRALAX) packet 17 g  17 g Oral DAILY PRN    ondansetron (ZOFRAN ODT) tablet 4 mg  4 mg Oral Q8H PRN    Or    ondansetron (ZOFRAN) injection 4 mg  4 mg IntraVENous Q6H PRN    enoxaparin (LOVENOX) injection 40 mg  40 mg SubCUTAneous DAILY    benzonatate (TESSALON) capsule 100 mg  100 mg Oral TID PRN    guaiFENesin-dextromethorphan (ROBITUSSIN DM) 100-10 mg/5 mL syrup 10 mL  10 mL Oral Q4H PRN       Signed:  Gerri Joseph MD    Part of this note may have been written by using a voice dictation software. The note has been proof read but may still contain some grammatical/other typographical errors.

## 2022-01-07 NOTE — PROGRESS NOTES
VANCO DAILY FOLLOW UP NOTE  [address] [facility] Pharmacokinetic Monitoring Service - Vancomycin    Consulting Provider: Dr. [place] Counter   Indication: Bloodstream infection  Target Concentration: Goal AUC/MURRAY 400-600 mg*hr/L  Day of Therapy: 3  Additional Antimicrobials: none    Pertinent Laboratory Values: Wt Readings from Last 1 Encounters:   01/07/22 99.8 kg (220 lb 0.3 oz)     Temp Readings from Last 1 Encounters:   01/07/22 98.1 °F (36.7 °C)     No components found for: PROCAL  Recent Labs     01/07/22  0643 01/06/22  0429 01/05/22  1228 01/05/22  1043   BUN 17 19  --  18   CREA 1.23 1.46  --  1.69*   WBC 4.4 4.9  --  8.1   PCT  --  6.43*  --  2.13*   LAC  --  1.1 1.4 1.9     Estimated Creatinine Clearance: 82 mL/min (based on SCr of 1.23 mg/dL). Lab Results   Component Value Date/Time    Vancomycin, random 7.6 01/07/2022 12:47 AM       MRSA Nasal Swab: N/A. Non-respiratory infection. .      Assessment:  Date/Time Dose Concentration AUC   1/6 @ 0429 1250 mg q24h 6.4 382   1/7 @ 0047 1250 mg q18h 7.6 409   Note: Serum concentrations collected for AUC dosing may appear elevated if collected in close proximity to the dose administered, this is not necessarily an indication of toxicity    Plan:  Current dosing regimen is therapeutic  Continue 1250 mg q18h  Repeat vancomycin concentration will be ordered as clinically necessary  Pharmacy will continue to monitor patient and adjust therapy as indicated    Thank you for the consult,  Charis Juares, Jeannie Culp, PharmD

## 2022-01-07 NOTE — PROGRESS NOTES
Provided financial assistance form and paperwork for [employer] for PCP follow up. Waiting work up for decision on IV ABT need with septic emboli. CM to continue to follow and monitor for any further needs.

## 2022-01-08 LAB
BACTERIA SPEC CULT: ABNORMAL
CREAT SERPL-MCNC: 1.08 MG/DL (ref 0.8–1.5)
GRAM STN SPEC: ABNORMAL
SERVICE CMNT-IMP: ABNORMAL
SERVICE CMNT-IMP: ABNORMAL

## 2022-01-08 PROCEDURE — 82565 ASSAY OF CREATININE: CPT

## 2022-01-08 PROCEDURE — 74011250636 HC RX REV CODE- 250/636: Performed by: INTERNAL MEDICINE

## 2022-01-08 PROCEDURE — 74011250637 HC RX REV CODE- 250/637: Performed by: INTERNAL MEDICINE

## 2022-01-08 PROCEDURE — 74011250637 HC RX REV CODE- 250/637: Performed by: EMERGENCY MEDICINE

## 2022-01-08 PROCEDURE — 36415 COLL VENOUS BLD VENIPUNCTURE: CPT

## 2022-01-08 PROCEDURE — 65270000029 HC RM PRIVATE

## 2022-01-08 PROCEDURE — 74011000250 HC RX REV CODE- 250: Performed by: EMERGENCY MEDICINE

## 2022-01-08 PROCEDURE — 74011250637 HC RX REV CODE- 250/637: Performed by: STUDENT IN AN ORGANIZED HEALTH CARE EDUCATION/TRAINING PROGRAM

## 2022-01-08 PROCEDURE — 74011250636 HC RX REV CODE- 250/636: Performed by: STUDENT IN AN ORGANIZED HEALTH CARE EDUCATION/TRAINING PROGRAM

## 2022-01-08 PROCEDURE — 87040 BLOOD CULTURE FOR BACTERIA: CPT

## 2022-01-08 PROCEDURE — 74011000250 HC RX REV CODE- 250: Performed by: STUDENT IN AN ORGANIZED HEALTH CARE EDUCATION/TRAINING PROGRAM

## 2022-01-08 RX ORDER — DOCUSATE SODIUM 100 MG/1
100 CAPSULE, LIQUID FILLED ORAL 2 TIMES DAILY
Status: DISCONTINUED | OUTPATIENT
Start: 2022-01-08 | End: 2022-01-12 | Stop reason: HOSPADM

## 2022-01-08 RX ADMIN — ENOXAPARIN SODIUM 40 MG: 100 INJECTION SUBCUTANEOUS at 09:24

## 2022-01-08 RX ADMIN — Medication 5 MG: at 20:30

## 2022-01-08 RX ADMIN — DIPHENHYDRAMINE HYDROCHLORIDE 25 MG: 25 CAPSULE ORAL at 20:30

## 2022-01-08 RX ADMIN — OXYCODONE 10 MG: 5 TABLET ORAL at 06:18

## 2022-01-08 RX ADMIN — HYDROMORPHONE HYDROCHLORIDE 1 MG: 1 INJECTION, SOLUTION INTRAMUSCULAR; INTRAVENOUS; SUBCUTANEOUS at 23:04

## 2022-01-08 RX ADMIN — SODIUM CHLORIDE, PRESERVATIVE FREE 10 ML: 5 INJECTION INTRAVENOUS at 13:27

## 2022-01-08 RX ADMIN — HYDROMORPHONE HYDROCHLORIDE 1 MG: 1 INJECTION, SOLUTION INTRAMUSCULAR; INTRAVENOUS; SUBCUTANEOUS at 01:58

## 2022-01-08 RX ADMIN — DOCUSATE SODIUM 100 MG: 100 CAPSULE ORAL at 09:24

## 2022-01-08 RX ADMIN — OXYCODONE 10 MG: 5 TABLET ORAL at 13:26

## 2022-01-08 RX ADMIN — SODIUM CHLORIDE, PRESERVATIVE FREE 10 ML: 5 INJECTION INTRAVENOUS at 23:05

## 2022-01-08 RX ADMIN — POLYETHYLENE GLYCOL 3350 17 G: 17 POWDER, FOR SOLUTION ORAL at 17:23

## 2022-01-08 RX ADMIN — SODIUM CHLORIDE, PRESERVATIVE FREE 10 ML: 5 INJECTION INTRAVENOUS at 05:14

## 2022-01-08 RX ADMIN — HYDROMORPHONE HYDROCHLORIDE 1 MG: 1 INJECTION, SOLUTION INTRAMUSCULAR; INTRAVENOUS; SUBCUTANEOUS at 09:28

## 2022-01-08 RX ADMIN — OXYCODONE 10 MG: 5 TABLET ORAL at 20:30

## 2022-01-08 RX ADMIN — VANCOMYCIN HYDROCHLORIDE 1250 MG: 10 INJECTION, POWDER, LYOPHILIZED, FOR SOLUTION INTRAVENOUS at 15:23

## 2022-01-08 RX ADMIN — Medication: at 09:00

## 2022-01-08 RX ADMIN — SODIUM CHLORIDE, PRESERVATIVE FREE 10 ML: 5 INJECTION INTRAVENOUS at 20:31

## 2022-01-08 RX ADMIN — HYDROMORPHONE HYDROCHLORIDE 1 MG: 1 INJECTION, SOLUTION INTRAMUSCULAR; INTRAVENOUS; SUBCUTANEOUS at 15:34

## 2022-01-08 NOTE — PROGRESS NOTES
Hospitalist Progress Note   Admit Date:  2022 11:17 AM   Name:  Chapincito Hwang   Age:  64 y.o. Sex:  male  :  1965   MRN:  [id number]   Room:  [place] [address]    Presenting Complaint: Blood infection    Reason(s) for Admission: Cellulitis [L03.90]     Hospital Course & Interval History:     Mr. Dion Gibson is a 63 yo male without PMH admitted with RUE cellulitis. He is found with MRSA bacteremia and RUE basilic thrombus. CTA chest shows septic thrombus/RML cavitation. He is on prophylactic lovenox. He is on IV vancomycin. TTE no IE. ID following. He had PETER that resolved with hydration. discharge plans pending. Subjective (22):       Wife present, has ongoing pain with breathing and RUE pain, with edema RUE, no BM, some anorexia          Assessment & Plan:     Principal Problem:    Sepsis ([address] (2022)  Active Problems:    Cellulitis (2022)   Bacteremia due to Gram-positive bacteria (2022)  · Continued IV vancomycin  · ID appreciated  · Repeat BC 22 pending           Septic embolism ([address] (6071)    Basilic vein thrombosis (2022)  · Continued lovenox  · Elevate RUE        Chest pain (2022)   pleuritic pain  · Continued dilaudid dose and  as needed oral oxycodone  · Wean O2 as tolerant, on 2 L NC          PETER (acute kidney injury) ([facility] (2022)  ·   Resolved  · Stopped  IVF 22           Hyponatremia (2022)  · Trend with BMP, pending 22        Anemia:  Thrombocytopenia:  · Trend CBC, pending 22      Elevated total bilirubin:  · Resolved         Dispo/Discharge Planning:    Pending     Diet:  ADULT DIET Regular  DVT PPx:lovenox  Code status: Full Code    Hospital Problems as of 2022 Never Reviewed          Codes Class Noted - Resolved POA    Bacteremia due to Gram-positive bacteria ICD-10-CM: R78.81  ICD-9-CM: 790.7  2022 - Present Unknown        Septic embolism ([facility] ICD-10-CM: S64  ICD-9-CM: 415.12  2022 - Present Unknown Cellulitis ICD-10-CM: L03.90  ICD-9-CM: 682.9  1/5/2022 - Present Unknown        * (Principal) Sepsis ([facility] ICD-10-CM: A41.9  ICD-9-CM: 038.9, 995.91  1/5/2022 - Present Unknown        Basilic vein thrombosis EGX-55-BB: I82.619  ICD-9-CM: 453.81  1/5/2022 - Present Unknown        Cough ICD-10-CM: R05.9  ICD-9-CM: 786.2  1/5/2022 - Present Unknown        Chest pain ICD-10-CM: R07.9  ICD-9-CM: 786.50  1/5/2022 - Present Unknown        PETER (acute kidney injury) ([facility] ICD-10-CM: N17.9  ICD-9-CM: 584.9  1/5/2022 - Present Unknown        Hyponatremia ICD-10-CM: E87.1  ICD-9-CM: 276.1  1/5/2022 - Present Unknown              Objective:     Patient Vitals for the past 24 hrs:   Temp Pulse Resp BP SpO2   01/08/22 0730 97.9 °F (36.6 °C) 73 16 (!) 140/83 97 %   01/07/22 2042 98.8 °F (37.1 °C) 91 20 137/77 96 %   01/07/22 1516 98 °F (36.7 °C) 81 20 129/77 99 %   01/07/22 1200 97.8 °F (36.6 °C) 76 20 129/83 100 %     Oxygen Therapy  O2 Sat (%): 97 % (01/08/22 0730)  Pulse via Oximetry: 86 beats per minute (01/06/22 0000)  O2 Device: Nasal cannula (01/08/22 0730)  O2 Flow Rate (L/min): 2 l/min (01/08/22 0730)    Estimated body mass index is 29.84 kg/m² as calculated from the following:    Height as of this encounter: 6' (1.829 m). Weight as of this encounter: 99.8 kg (220 lb 0.3 oz). Intake/Output Summary (Last 24 hours) at 1/8/2022 0824  Last data filed at 1/7/2022 1820  Gross per 24 hour   Intake 600 ml   Output 900 ml   Net -300 ml         Physical Exam:     Blood pressure (!) 140/83, pulse 73, temperature 97.9 °F (36.6 °C), resp. rate 16, height 6' (1.829 m), weight 99.8 kg (220 lb 0.3 oz), SpO2 97 %. General:    Well nourished. No overt distress, alert, pleasant   CV:   RRR. No m/r/g. No jugular venous distension. No edema  LE   Lungs:   CTAB. No wheezing, rhonchi, or rales. Respirations even, unlabored anterior   Abdomen: Bowel sounds present. Soft, nontender, nondistended.   Extremities: Edematous RUE, 2+ right radial pulse   Skin:     No rashes and normal coloration. Warm and dry. Neuro:  grossly intact. Psych:  Normal mood and affect. I have reviewed ordered lab tests and independently visualized imaging below:    Recent Labs:  Recent Results (from the past 48 hour(s))   COVID-19 RAPID TEST    Collection Time: 01/06/22  8:59 AM   Result Value Ref Range    Specimen source NASAL      COVID-19 rapid test Not detected NOTD     HIV 1/2 AG/AB, 4TH GENERATION,W RFLX CONFIRM    Collection Time: 01/06/22 12:16 PM   Result Value Ref Range    HIV 1/2 Interpretation NONREACTIVE NR      HIV 1/2 result comment SEE NOTE (A) NR     VANCOMYCIN, RANDOM    Collection Time: 01/07/22 12:47 AM   Result Value Ref Range    Vancomycin, random 7.6 UG/ML   TROPONIN-HIGH SENSITIVITY    Collection Time: 01/07/22 12:47 AM   Result Value Ref Range    Troponin-High Sensitivity 18.7 (H) 0 - 14 pg/mL   METABOLIC PANEL, COMPREHENSIVE    Collection Time: 01/07/22  6:43 AM   Result Value Ref Range    Sodium 134 (L) 136 - 145 mmol/L    Potassium 3.8 3.5 - 5.1 mmol/L    Chloride 104 98 - 107 mmol/L    CO2 21 21 - 32 mmol/L    Anion gap 9 7 - 16 mmol/L    Glucose 90 65 - 100 mg/dL    BUN 17 6 - 23 MG/DL    Creatinine 1.23 0.8 - 1.5 MG/DL    GFR est AA >60 >60 ml/min/1.73m2    GFR est non-AA >60 >60 ml/min/1.73m2    Calcium 8.4 8.3 - 10.4 MG/DL    Bilirubin, total 0.8 0.2 - 1.1 MG/DL    ALT (SGPT) 27 12 - 65 U/L    AST (SGOT) 18 15 - 37 U/L    Alk.  phosphatase 46 (L) 50 - 136 U/L    Protein, total 5.7 (L) 6.3 - 8.2 g/dL    Albumin 2.0 (L) 3.5 - 5.0 g/dL    Globulin 3.7 (H) 2.3 - 3.5 g/dL    A-G Ratio 0.5 (L) 1.2 - 3.5     CBC WITH AUTOMATED DIFF    Collection Time: 01/07/22  6:43 AM   Result Value Ref Range    WBC 4.4 4.3 - 11.1 K/uL    RBC 3.96 (L) 4.23 - 5.6 M/uL    HGB 10.7 (L) 13.6 - 17.2 g/dL    HCT 33.5 (L) 41.1 - 50.3 %    MCV 84.6 79.6 - 97.8 FL    MCH 27.0 26.1 - 32.9 PG    MCHC 31.9 31.4 - 35.0 g/dL    RDW 14.3 11.9 - 14.6 % PLATELET 98 (L) 505 - 450 K/uL    MPV 11.2 9.4 - 12.3 FL    ABSOLUTE NRBC 0.00 0.0 - 0.2 K/uL    NEUTROPHILS 71 43 - 78 %    LYMPHOCYTES 15 13 - 44 %    MONOCYTES 11 4.0 - 12.0 %    EOSINOPHILS 1 0.5 - 7.8 %    BASOPHILS 1 0.0 - 2.0 %    IMMATURE GRANULOCYTES 1 0.0 - 5.0 %    ABS. NEUTROPHILS 3.2 1.7 - 8.2 K/UL    ABS. LYMPHOCYTES 0.7 0.5 - 4.6 K/UL    ABS. MONOCYTES 0.5 0.1 - 1.3 K/UL    ABS. EOSINOPHILS 0.0 0.0 - 0.8 K/UL    ABS. BASOPHILS 0.0 0.0 - 0.2 K/UL    ABS. IMM.  GRANS. 0.0 0.0 - 0.5 K/UL    RBC COMMENTS NORMOCYTIC/NORMOCHROMIC      WBC COMMENTS Result Confirmed By Smear      PLATELET COMMENTS DECREASED      DF AUTOMATED     ECHO ADULT COMPLETE    Collection Time: 01/07/22 11:05 AM   Result Value Ref Range    IVSd 1.0 0.6 - 1.0 cm    LVIDd 5.0 4.2 - 5.9 cm    LVIDs 3.4 cm    LVOT Diameter 1.9 cm    LVPWd 1.0 0.6 - 1.0 cm    LVOT SV 82.5 ml    LV Ejection Fraction A4C 65 %    LV EDV A4C 136 mL    LV ESV A4C 48 mL    LVOT Peak Gradient 9 mmHg    LVOT Mean Gradient 4 mmHg    LVOT Peak Velocity 1.5 m/s    LVOT VTI 29.1 cm    RVIDd 2.9 cm    RV Free Wall Peak S' 17 cm/s    LA Diameter 4.5 cm    LA Volume A/L 77 mL    LA Volume 2C 66 (A) 18 - 58 mL    LA Volume 4C 79 (A) 18 - 58 mL    Est. RA Pressure 3 mmHg    AV Area by Peak Velocity 2.7 cm2    AV Area by Peak Velocity 2.7 cm2    AV Area by VTI 2.8 cm2    AV Area by VTI 2.8 cm2    AV Peak Gradient 9 mmHg    AV Mean Gradient 5 mmHg    AV Peak Velocity 1.5 m/s    AV Mean Velocity 1.0 m/s    AV VTI 28.8 cm    MV A Velocity 0.51 m/s    MV E Wave Deceleration Time 221.8 ms    MV E Velocity 0.79 m/s    LV E' Lateral Velocity 12 cm/s    LV E' Septal Velocity 9 cm/s    MV Area by VTI 2.9 cm2    MV Peak Gradient 5 mmHg    MV Mean Gradient 2 mmHg    MV Max Velocity 1.1 m/s    MV Mean Velocity 0.6 m/s    MV VTI 28.6 cm    PV AT 99.9 ms    PV Peak Gradient 6 mmHg    PV Max Velocity 1.2 m/s    TAPSE 2.1 (A) 1.5 - 2.0 cm    Ascending Aorta 2.9 cm    Aortic Root 3.2 cm Fractional Shortening 2D 32 28 - 44 %    LV ESV Index A4C 22 mL/m2    LV EDV Index A4C 61 mL/m2    LVIDd Index 2.25 cm/m2    LVIDs Index 1.53 cm/m2    LV RWT Ratio 0.40     LV Mass 2D 182.0 88 - 224 g    LV Mass 2D Index 82.0 49 - 115 g/m2    MV E/A 1.55     E/E' Ratio (Averaged) 7.68     E/E' Lateral 6.58     E/E' Septal 8.78     LA Volume Index A/L 35 16 - 34 mL/m2    LVOT Stroke Volume Index 37.1 mL/m2    LVOT Area 2.8 cm2    LA Volume Index 2C 30 16 - 34 mL/m2    LA Volume Index 4C 36 (A) 16 - 34 mL/m2    LA Size Index 2.03 cm/m2    LA/AO Root Ratio 1.41     Ao Root Index 1.44 cm/m2    Ascending Aorta Index 1.31 cm/m2    AV Velocity Ratio 1.00     LVOT:AV VTI Index 1.01     MV:LVOT VTI Index 0.98    CREATININE AND GFR    Collection Time: 01/08/22  4:24 AM   Result Value Ref Range    Creatinine 1.08 0.8 - 1.5 MG/DL    GFR est AA >60 >60 ml/min/1.73m2    GFR est non-AA >60 >60 ml/min/1.73m2       All Micro Results     Procedure Component Value Units Date/Time    BLOOD CULTURE [[id number]]  (Abnormal) Collected: 01/05/22 1228    Order Status: Completed Specimen: Blood Updated: 01/08/22 0749     Special Requests: --        LEFT  HAND       GRAM STAIN GRAM POSITIVE COCCI               AEROBIC AND ANAEROBIC BOTTLES                  CRITICAL RESULT NOT CALLED DUE TO PREVIOUS NOTIFICATION OF CRITICAL RESULT WITHIN THE LAST 24 HOURS.            Culture result: STAPHYLOCOCCUS AUREUS               For Susceptibility Refer to Culture  Accession [id number]      BLOOD CULTURE [[id number]]  (Abnormal)  (Susceptibility) Collected: 01/05/22 1043    Order Status: Completed Specimen: Blood Updated: 01/08/22 0748     Special Requests: --        RIGHT  ARM       GRAM STAIN GRAM POSITIVE COCCI               AEROBIC AND ANAEROBIC BOTTLES                  RESULTS VERIFIED, PHONED TO AND READ BACK BY MADELYNRN @ 0130 1/6/22 MM           Culture result:       * METHICILLIN RESISTANT STAPHYLOCOCCUS AUREUS *                  Refer to Blood Culture ID Panel Accession  [id number]              RESULTS VERIFIED, PHONED TO AND READ BACK BY  Cecelia Kumar RN ON 1/8/22 @0746, TA      CULTURE, BLOOD [[id number]] Collected: 01/08/22 0424    Order Status: Completed Specimen: Blood Updated: 01/08/22 0502    CULTURE, BLOOD [[id number]] Collected: 01/08/22 0424    Order Status: Completed Specimen: Blood Updated: 01/08/22 0502    COVID-19 RAPID TEST [[id number]] Collected: 01/06/22 0859    Order Status: Completed Specimen: Nasopharyngeal Updated: 01/06/22 0932     Specimen source NASAL        COVID-19 rapid test Not detected        Comment:      The specimen is NEGATIVE for SARS-CoV-2, the novel coronavirus associated with COVID-19. A negative result does not rule out COVID-19. This test has been authorized by the FDA under an Emergency Use Authorization (EUA) for use by authorized laboratories. Fact sheet for Healthcare Providers: ConventionDineroTaxidate.co.nz  Fact sheet for Patients: ACHICAdate.co.nz       Methodology: Isothermal Nucleic Acid Amplification         BLOOD CULTURE ID PANEL [[id number]]  (Abnormal) Collected: 01/05/22 1043    Order Status: Completed Specimen: Blood Updated: 01/06/22 0713     Acc. no. from Micro Order [id number]     Staphylococcus Detected        Comment: RESULTS VERIFIED, PHONED TO AND READ BACK BY  BERTO JOSHI @ 0130 1/6/22 MM          Staphylococcus aureus Detected        Comment: RESULTS VERIFIED, PHONED TO AND READ BACK BY  BERTO JOSHI @ 0130 1/6/22 MM          mecA (Methicillin-Resistance Genes) Detected        Comment: Presence of mecA is highly indicative of methicillin resistance. The test does not replace traditional culture and susceptibilities        INTERPRETATION       Gram positive cocci in clusters, identified by realtime PCR as SUSPECTED MRSA. Comment: Recommend IV vancomycin use, unlikely to be a contaminant.   Infectious Diseases Consult recommended in adult patients. THIS TEST DOES NOT REPLACE SENSITIVITY TESTING. Other Studies:  ECHO ADULT COMPLETE    Result Date: 1/7/2022    Left Ventricle: Left ventricle size is normal. Mildly increased wall thickness. Normal wall motion. Normal left ventricular systolic function with a visually estimated EF of 60 - 65%. Normal diastolic function.   Left Atrium: Left atrium is mildly dilated.   Contrast used: Definity.        Current Meds:  Current Facility-Administered Medications   Medication Dose Route Frequency    HYDROmorphone (DILAUDID) injection 1 mg  1 mg IntraVENous Q4H PRN    oxyCODONE IR (ROXICODONE) tablet 10 mg  10 mg Oral Q4H PRN    influenza vaccine 2021-22 (6 mos+)(PF) (FLUARIX/FLULAVAL/FLUZONE QUAD) injection 0.5 mL  1 Each IntraMUSCular PRIOR TO DISCHARGE    vancomycin (VANCOCIN) 1250 mg in  ml infusion  1,250 mg IntraVENous Q18H    pantothenic ac-min oil-pet,hyd (AQUAPHOR) 41 % ointment   Topical DAILY    melatonin tablet 5 mg  5 mg Oral QHS    diphenhydrAMINE (BENADRYL) capsule 25 mg  25 mg Oral QHS PRN    sodium chloride (NS) flush 5-10 mL  5-10 mL IntraVENous Q8H    sodium chloride (NS) flush 5-10 mL  5-10 mL IntraVENous PRN    sodium chloride (NS) flush 5-40 mL  5-40 mL IntraVENous Q8H    sodium chloride (NS) flush 5-40 mL  5-40 mL IntraVENous PRN    acetaminophen (TYLENOL) tablet 650 mg  650 mg Oral Q6H PRN    Or    acetaminophen (TYLENOL) suppository 650 mg  650 mg Rectal Q6H PRN    polyethylene glycol (MIRALAX) packet 17 g  17 g Oral DAILY PRN    ondansetron (ZOFRAN ODT) tablet 4 mg  4 mg Oral Q8H PRN    Or    ondansetron (ZOFRAN) injection 4 mg  4 mg IntraVENous Q6H PRN    enoxaparin (LOVENOX) injection 40 mg  40 mg SubCUTAneous DAILY    benzonatate (TESSALON) capsule 100 mg  100 mg Oral TID PRN    guaiFENesin-dextromethorphan (ROBITUSSIN DM) 100-10 mg/5 mL syrup 10 mL  10 mL Oral Q4H PRN       Signed:  Anastacia Okeefe MD    Part of this note may have been written by using a voice dictation software. The note has been proof read but may still contain some grammatical/other typographical errors.

## 2022-01-08 NOTE — PROGRESS NOTES
VANCO DAILY FOLLOW UP NOTE  [address] [facility] Pharmacokinetic Monitoring Service - Vancomycin    Consulting Provider: Dr. Banda [facility]   Indication: Bloodstream infection  Target Concentration: Goal AUC/MURRAY 400-600 mg*hr/L  Day of Therapy: 4  Additional Antimicrobials: none    Pertinent Laboratory Values: Wt Readings from Last 1 Encounters:   01/07/22 99.8 kg (220 lb 0.3 oz)     Temp Readings from Last 1 Encounters:   01/08/22 98.2 °F (36.8 °C)     No components found for: PROCAL  Recent Labs     01/08/22  0424 01/07/22  0643 01/06/22  0429   BUN  --  17 19   CREA 1.08 1.23 1.46   WBC  --  4.4 4.9   PCT  --   --  6.43*   LAC  --   --  1.1     Estimated Creatinine Clearance: 93.4 mL/min (based on SCr of 1.08 mg/dL). Lab Results   Component Value Date/Time    Vancomycin, random 7.6 01/07/2022 12:47 AM     MRSA Nasal Swab: N/A. Non-respiratory infection. .    Assessment:  Date/Time Dose Concentration AUC   1/6 0429 1250 mg q24h 6.4 382   1/7 0047 1250 mg q18h 7.6 409   Note: Serum concentrations collected for AUC dosing may appear elevated if collected in close proximity to the dose administered, this is not necessarily an indication of toxicity    Plan:  Current dosing regimen is therapeutic  Continue 1250 mg q18h  Repeat vancomycin concentration ordered for 1/9 @ 0400  Pharmacy will continue to monitor patient and adjust therapy as indicated    Thank you for the consult,  HANSEL Bernard

## 2022-01-09 LAB
ANION GAP SERPL CALC-SCNC: 4 MMOL/L (ref 7–16)
BASOPHILS # BLD: 0 K/UL (ref 0–0.2)
BASOPHILS NFR BLD: 1 % (ref 0–2)
BUN SERPL-MCNC: 14 MG/DL (ref 6–23)
CALCIUM SERPL-MCNC: 8.5 MG/DL (ref 8.3–10.4)
CHLORIDE SERPL-SCNC: 99 MMOL/L (ref 98–107)
CO2 SERPL-SCNC: 30 MMOL/L (ref 21–32)
CREAT SERPL-MCNC: 1.02 MG/DL (ref 0.8–1.5)
DIFFERENTIAL METHOD BLD: ABNORMAL
EOSINOPHIL # BLD: 0 K/UL (ref 0–0.8)
EOSINOPHIL NFR BLD: 1 % (ref 0.5–7.8)
ERYTHROCYTE [DISTWIDTH] IN BLOOD BY AUTOMATED COUNT: 14 % (ref 11.9–14.6)
GLUCOSE SERPL-MCNC: 91 MG/DL (ref 65–100)
HCT VFR BLD AUTO: 34.7 % (ref 41.1–50.3)
HGB BLD-MCNC: 11.2 G/DL (ref 13.6–17.2)
IMM GRANULOCYTES # BLD AUTO: 0 K/UL (ref 0–0.5)
IMM GRANULOCYTES NFR BLD AUTO: 1 % (ref 0–5)
LYMPHOCYTES # BLD: 0.8 K/UL (ref 0.5–4.6)
LYMPHOCYTES NFR BLD: 17 % (ref 13–44)
MCH RBC QN AUTO: 28 PG (ref 26.1–32.9)
MCHC RBC AUTO-ENTMCNC: 32.3 G/DL (ref 31.4–35)
MCV RBC AUTO: 86.8 FL (ref 79.6–97.8)
MONOCYTES # BLD: 0.5 K/UL (ref 0.1–1.3)
MONOCYTES NFR BLD: 11 % (ref 4–12)
NEUTS SEG # BLD: 3.4 K/UL (ref 1.7–8.2)
NEUTS SEG NFR BLD: 70 % (ref 43–78)
NRBC # BLD: 0 K/UL (ref 0–0.2)
PLATELET # BLD AUTO: 132 K/UL (ref 150–450)
PMV BLD AUTO: 11.1 FL (ref 9.4–12.3)
POTASSIUM SERPL-SCNC: 3.9 MMOL/L (ref 3.5–5.1)
RBC # BLD AUTO: 4 M/UL (ref 4.23–5.6)
SODIUM SERPL-SCNC: 133 MMOL/L (ref 136–145)
VANCOMYCIN SERPL-MCNC: 9.9 UG/ML
WBC # BLD AUTO: 4.9 K/UL (ref 4.3–11.1)

## 2022-01-09 PROCEDURE — 74011250637 HC RX REV CODE- 250/637: Performed by: INTERNAL MEDICINE

## 2022-01-09 PROCEDURE — 74011250636 HC RX REV CODE- 250/636: Performed by: INTERNAL MEDICINE

## 2022-01-09 PROCEDURE — 74011250636 HC RX REV CODE- 250/636: Performed by: STUDENT IN AN ORGANIZED HEALTH CARE EDUCATION/TRAINING PROGRAM

## 2022-01-09 PROCEDURE — 36415 COLL VENOUS BLD VENIPUNCTURE: CPT

## 2022-01-09 PROCEDURE — 74011000250 HC RX REV CODE- 250: Performed by: STUDENT IN AN ORGANIZED HEALTH CARE EDUCATION/TRAINING PROGRAM

## 2022-01-09 PROCEDURE — 80202 ASSAY OF VANCOMYCIN: CPT

## 2022-01-09 PROCEDURE — 74011250637 HC RX REV CODE- 250/637: Performed by: EMERGENCY MEDICINE

## 2022-01-09 PROCEDURE — 80048 BASIC METABOLIC PNL TOTAL CA: CPT

## 2022-01-09 PROCEDURE — 65270000029 HC RM PRIVATE

## 2022-01-09 PROCEDURE — 85025 COMPLETE CBC W/AUTO DIFF WBC: CPT

## 2022-01-09 RX ORDER — POLYETHYLENE GLYCOL 3350 17 G/17G
17 POWDER, FOR SOLUTION ORAL DAILY
Status: DISCONTINUED | OUTPATIENT
Start: 2022-01-09 | End: 2022-01-12 | Stop reason: HOSPADM

## 2022-01-09 RX ORDER — VANCOMYCIN HYDROCHLORIDE
1250 EVERY 12 HOURS
Status: DISCONTINUED | OUTPATIENT
Start: 2022-01-09 | End: 2022-01-12 | Stop reason: HOSPADM

## 2022-01-09 RX ADMIN — POLYETHYLENE GLYCOL 3350 17 G: 17 POWDER, FOR SOLUTION ORAL at 12:17

## 2022-01-09 RX ADMIN — HYDROMORPHONE HYDROCHLORIDE 1 MG: 1 INJECTION, SOLUTION INTRAMUSCULAR; INTRAVENOUS; SUBCUTANEOUS at 12:22

## 2022-01-09 RX ADMIN — SODIUM CHLORIDE, PRESERVATIVE FREE 10 ML: 5 INJECTION INTRAVENOUS at 06:40

## 2022-01-09 RX ADMIN — SODIUM CHLORIDE, PRESERVATIVE FREE 10 ML: 5 INJECTION INTRAVENOUS at 13:07

## 2022-01-09 RX ADMIN — HYDROMORPHONE HYDROCHLORIDE 1 MG: 1 INJECTION, SOLUTION INTRAMUSCULAR; INTRAVENOUS; SUBCUTANEOUS at 16:43

## 2022-01-09 RX ADMIN — Medication 1 AMPULE: at 21:05

## 2022-01-09 RX ADMIN — Medication 5 MG: at 21:05

## 2022-01-09 RX ADMIN — ENOXAPARIN SODIUM 40 MG: 100 INJECTION SUBCUTANEOUS at 09:33

## 2022-01-09 RX ADMIN — Medication: at 09:00

## 2022-01-09 RX ADMIN — VANCOMYCIN HYDROCHLORIDE 1250 MG: 10 INJECTION, POWDER, LYOPHILIZED, FOR SOLUTION INTRAVENOUS at 09:33

## 2022-01-09 RX ADMIN — Medication 1 AMPULE: at 09:32

## 2022-01-09 RX ADMIN — DOCUSATE SODIUM 100 MG: 100 CAPSULE ORAL at 09:32

## 2022-01-09 RX ADMIN — OXYCODONE 10 MG: 5 TABLET ORAL at 18:35

## 2022-01-09 RX ADMIN — OXYCODONE 10 MG: 5 TABLET ORAL at 14:42

## 2022-01-09 RX ADMIN — OXYCODONE 10 MG: 5 TABLET ORAL at 03:33

## 2022-01-09 RX ADMIN — DOCUSATE SODIUM 100 MG: 100 CAPSULE ORAL at 16:44

## 2022-01-09 RX ADMIN — HYDROMORPHONE HYDROCHLORIDE 1 MG: 1 INJECTION, SOLUTION INTRAMUSCULAR; INTRAVENOUS; SUBCUTANEOUS at 06:37

## 2022-01-09 RX ADMIN — OXYCODONE 10 MG: 5 TABLET ORAL at 09:20

## 2022-01-09 RX ADMIN — VANCOMYCIN HYDROCHLORIDE 1250 MG: 10 INJECTION, POWDER, LYOPHILIZED, FOR SOLUTION INTRAVENOUS at 21:02

## 2022-01-09 RX ADMIN — HYDROMORPHONE HYDROCHLORIDE 1 MG: 1 INJECTION, SOLUTION INTRAMUSCULAR; INTRAVENOUS; SUBCUTANEOUS at 21:56

## 2022-01-09 RX ADMIN — OXYCODONE 10 MG: 5 TABLET ORAL at 23:12

## 2022-01-09 RX ADMIN — SODIUM CHLORIDE, PRESERVATIVE FREE 10 ML: 5 INJECTION INTRAVENOUS at 21:08

## 2022-01-09 NOTE — PROGRESS NOTES
VANCO DAILY FOLLOW UP NOTE  [address] [facility] Pharmacokinetic Monitoring Service - Vancomycin    Consulting Provider: Dr. Lahtam Pro   Indication: Bloodstream infection  Target Concentration: Goal AUC/MURRAY 400-600 mg*hr/L  Day of Therapy: 5  Additional Antimicrobials: none    Pertinent Laboratory Values: Wt Readings from Last 1 Encounters:   01/09/22 99.3 kg (218 lb 14.7 oz)     Temp Readings from Last 1 Encounters:   01/09/22 98.2 °F (36.8 °C)     No components found for: PROCAL  Recent Labs     01/09/22  0554 01/08/22  0424 01/07/22  0643   BUN 14  --  17   CREA 1.02 1.08 1.23   WBC 4.9  --  4.4     Estimated Creatinine Clearance: 98.7 mL/min (based on SCr of 1.02 mg/dL). Lab Results   Component Value Date/Time    Vancomycin, random 9.9 01/09/2022 05:54 AM     MRSA Nasal Swab: N/A. Non-respiratory infection. .    Assessment:  Date/Time Dose Concentration AUC   1/6 0429 1250 mg q24h 6.4 382   1/7 0047 1250 mg q18h 7.6 409   1/9 0554 1250 mg q18h 9.9 313   Note: Serum concentrations collected for AUC dosing may appear elevated if collected in close proximity to the dose administered, this is not necessarily an indication of toxicity    Plan:  Current dosing regimen is sub-therapeutic  Increase dose to 1250 mg q12h for predicted AUC/Tr of 464/14.1  Repeat vancomycin concentration ordered as clinically indicated  Pharmacy will continue to monitor patient and adjust therapy as indicated    Thank you for the consult,  [facility]

## 2022-01-09 NOTE — PROGRESS NOTES
Hospitalist Progress Note   Admit Date:  2022 11:17 AM   Name:  Martha Jesus   Age:  64 y.o. Sex:  male  :  1965   MRN:  [id number]   Room:  [place] [address]    Presenting Complaint: Blood infection    Reason(s) for Admission: Cellulitis [L03.90]     Hospital Course & Interval History:     Mr. Yamileth Garcia is a 63 yo male without PMH admitted with RUE cellulitis. He is found with MRSA bacteremia and RUE basilic thrombus. CTA chest shows septic thrombus/RML cavitation. He is on prophylactic lovenox. He is on IV vancomycin. TTE no IE. ID following. He had PETER that resolved with hydration. discharge plans pending. Subjective (22):     Sleeping, still edematous RUE and some pain, some chest pains with breaths and shortness of breath, ate, no BM, not very mobile    Assessment & Plan:     Principal Problem:    Sepsis ([address] (2022)  Active Problems:    Cellulitis (2022)   Bacteremia due to Gram-positive bacteria (2022)  · Continued IV vancomycin  · ID appreciated  · Repeat BC 22 pending           Septic embolism ([address] (6/3/1853)    Basilic vein thrombosis (2022)  · Continued lovenox  · Elevate RUE        Chest pain (2022)   pleuritic pain  · Continued dilaudid dose and  as needed oral oxycodone  · Wean O2 as tolerant, on 2 L NC          PETER (acute kidney injury) ([facility] (2022)  ·   Resolved  · Stopped  IVF 22           Hyponatremia (2022)  · Trend with BMP,  133        Anemia:  Thrombocytopenia:  · Trend CBC, improving 22       Elevated total bilirubin:  · Resolved         Dispo/Discharge Planning:    Pending     Diet:  ADULT DIET Regular  DVT PPx:lovenox  Code status: Full Code    Hospital Problems as of 2022 Never Reviewed          Codes Class Noted - Resolved POA    Bacteremia due to Gram-positive bacteria ICD-10-CM: R78.81  ICD-9-CM: 790.7  2022 - Present Unknown        Septic embolism ([facility] [address] ICD-10-CM: F71  ICD-9-CM: 415.12  2022 - Present Unknown        Cellulitis ICD-10-CM: L03.90  ICD-9-CM: 682.9  1/5/2022 - Present Unknown        * (Principal) Sepsis ([facility] ICD-10-CM: A41.9  ICD-9-CM: 038.9, 995.91  1/5/2022 - Present Unknown        Basilic vein thrombosis [facility]-EV: I82.619  ICD-9-CM: 453.81  1/5/2022 - Present Unknown        Cough ICD-10-CM: R05.9  ICD-9-CM: 786.2  1/5/2022 - Present Unknown        Chest pain ICD-10-CM: R07.9  ICD-9-CM: 786.50  1/5/2022 - Present Unknown        PETER (acute kidney injury) ([facility] ICD-10-CM: N17.9  ICD-9-CM: 584.9  1/5/2022 - Present Unknown        Hyponatremia ICD-10-CM: E87.1  ICD-9-CM: 276.1  1/5/2022 - Present Unknown              Objective:     Patient Vitals for the past 24 hrs:   Temp Pulse Resp BP SpO2   01/09/22 0928 98.2 °F (36.8 °C) 77 18 (!) 149/90 95 %   01/09/22 0330 98.6 °F (37 °C) 76 18 (!) 142/82 99 %   01/08/22 2301 98.3 °F (36.8 °C) 74 16 (!) 142/87 99 %   01/08/22 2034 98.5 °F (36.9 °C) 83 18 (!) 146/83 100 %   01/08/22 1522 98.2 °F (36.8 °C) 80 16 (!) 145/81 95 %   01/08/22 1145 98.2 °F (36.8 °C) 74 16 121/80 96 %     Oxygen Therapy  O2 Sat (%): 95 % (01/09/22 0928)  Pulse via Oximetry: 86 beats per minute (01/06/22 0000)  O2 Device: None (Room air) (01/08/22 1522)  O2 Flow Rate (L/min): 2 l/min (01/08/22 1522)    Estimated body mass index is 29.69 kg/m² as calculated from the following:    Height as of this encounter: 6' (1.829 m). Weight as of this encounter: 99.3 kg (218 lb 14.7 oz). Intake/Output Summary (Last 24 hours) at 1/9/2022 1143  Last data filed at 1/9/2022 0643  Gross per 24 hour   Intake    Output 2225 ml   Net -2225 ml         Physical Exam:     Blood pressure (!) 149/90, pulse 77, temperature 98.2 °F (36.8 °C), resp. rate 18, height 6' (1.829 m), weight 99.3 kg (218 lb 14.7 oz), SpO2 95 %. General:    Well nourished. No overt distress, sleepy   CV:   RRR. No m/r/g. No jugular venous distension. No edema  LE   Lungs:   CTAB. No wheezing, rhonchi, or rales.   Respirations even, unlabored anterior   Abdomen: Bowel sounds present. Soft, nontender, nondistended. Extremities: Edematous RUE  Skin:     No rashes and normal coloration. Warm and dry. Neuro:  grossly intact. Psych:  Normal mood and affect. I have reviewed ordered lab tests and independently visualized imaging below:    Recent Labs:  Recent Results (from the past 48 hour(s))   CREATININE AND GFR    Collection Time: 01/08/22  4:24 AM   Result Value Ref Range    Creatinine 1.08 0.8 - 1.5 MG/DL    GFR est AA >60 >60 ml/min/1.73m2    GFR est non-AA >60 >60 ml/min/1.73m2   CULTURE, BLOOD    Collection Time: 01/08/22  4:24 AM    Specimen: Blood   Result Value Ref Range    Special Requests: LEFT  Antecubital        Culture result: NO GROWTH 1 DAY     CULTURE, BLOOD    Collection Time: 01/08/22  4:24 AM    Specimen: Blood   Result Value Ref Range    Special Requests: LEFT  HAND        Culture result: NO GROWTH 1 DAY     CBC WITH AUTOMATED DIFF    Collection Time: 01/09/22  5:54 AM   Result Value Ref Range    WBC 4.9 4.3 - 11.1 K/uL    RBC 4.00 (L) 4.23 - 5.6 M/uL    HGB 11.2 (L) 13.6 - 17.2 g/dL    HCT 34.7 (L) 41.1 - 50.3 %    MCV 86.8 79.6 - 97.8 FL    MCH 28.0 26.1 - 32.9 PG    MCHC 32.3 31.4 - 35.0 g/dL    RDW 14.0 11.9 - 14.6 %    PLATELET 649 (L) 764 - 450 K/uL    MPV 11.1 9.4 - 12.3 FL    ABSOLUTE NRBC 0.00 0.0 - 0.2 K/uL    DF AUTOMATED      NEUTROPHILS 70 43 - 78 %    LYMPHOCYTES 17 13 - 44 %    MONOCYTES 11 4.0 - 12.0 %    EOSINOPHILS 1 0.5 - 7.8 %    BASOPHILS 1 0.0 - 2.0 %    IMMATURE GRANULOCYTES 1 0.0 - 5.0 %    ABS. NEUTROPHILS 3.4 1.7 - 8.2 K/UL    ABS. LYMPHOCYTES 0.8 0.5 - 4.6 K/UL    ABS. MONOCYTES 0.5 0.1 - 1.3 K/UL    ABS. EOSINOPHILS 0.0 0.0 - 0.8 K/UL    ABS. BASOPHILS 0.0 0.0 - 0.2 K/UL    ABS. IMM.  GRANS. 0.0 0.0 - 0.5 K/UL   METABOLIC PANEL, BASIC    Collection Time: 01/09/22  5:54 AM   Result Value Ref Range    Sodium 133 (L) 136 - 145 mmol/L    Potassium 3.9 3.5 - 5.1 mmol/L    Chloride 99 98 - 107 mmol/L    CO2 30 21 - 32 mmol/L    Anion gap 4 (L) 7 - 16 mmol/L    Glucose 91 65 - 100 mg/dL    BUN 14 6 - 23 MG/DL    Creatinine 1.02 0.8 - 1.5 MG/DL    GFR est AA >60 >60 ml/min/1.73m2    GFR est non-AA >60 >60 ml/min/1.73m2    Calcium 8.5 8.3 - 10.4 MG/DL   VANCOMYCIN, RANDOM    Collection Time: 01/09/22  5:54 AM   Result Value Ref Range    Vancomycin, random 9.9 UG/ML       All Micro Results     Procedure Component Value Units Date/Time    CULTURE, BLOOD [[id number]] Collected: 01/08/22 0424    Order Status: Completed Specimen: Blood Updated: 01/09/22 1112     Special Requests: --        LEFT  Antecubital       Culture result: NO GROWTH 1 DAY       CULTURE, BLOOD [[id number]] Collected: 01/08/22 0424    Order Status: Completed Specimen: Blood Updated: 01/09/22 1112     Special Requests: --        LEFT  HAND       Culture result: NO GROWTH 1 DAY       BLOOD CULTURE [[id number]]  (Abnormal) Collected: 01/05/22 1228    Order Status: Completed Specimen: Blood Updated: 01/08/22 0749     Special Requests: --        LEFT  HAND       GRAM STAIN GRAM POSITIVE COCCI               AEROBIC AND ANAEROBIC BOTTLES                  CRITICAL RESULT NOT CALLED DUE TO PREVIOUS NOTIFICATION OF CRITICAL RESULT WITHIN THE LAST 24 HOURS.            Culture result: STAPHYLOCOCCUS AUREUS               For Susceptibility Refer to Culture  Accession [id number]      BLOOD CULTURE [[id number]]  (Abnormal)  (Susceptibility) Collected: 01/05/22 1043    Order Status: Completed Specimen: Blood Updated: 01/08/22 0748     Special Requests: --        RIGHT  ARM       GRAM STAIN GRAM POSITIVE COCCI               AEROBIC AND ANAEROBIC BOTTLES                  RESULTS VERIFIED, PHONED TO AND READ BACK BY BERTO JOSHI @ 0130 1/6/22 MM           Culture result:       * METHICILLIN RESISTANT STAPHYLOCOCCUS AUREUS *                  Refer to Blood Culture ID Panel Accession  [id number]              RESULTS VERIFIED, PHONED TO AND READ BACK BY  Al Concepcion Sampson Mitchell RN ON 1/8/22 @0746, TA      COVID-19 RAPID TEST [[id number]] Collected: 01/06/22 0859    Order Status: Completed Specimen: Nasopharyngeal Updated: 01/06/22 0932     Specimen source NASAL        COVID-19 rapid test Not detected        Comment:      The specimen is NEGATIVE for SARS-CoV-2, the novel coronavirus associated with COVID-19. A negative result does not rule out COVID-19. This test has been authorized by the FDA under an Emergency Use Authorization (EUA) for use by authorized laboratories. Fact sheet for Healthcare Providers: Novinda[employer]nz  Fact sheet for Patients: Novinda[employer]nz       Methodology: Isothermal Nucleic Acid Amplification         BLOOD CULTURE ID PANEL [[id number]]  (Abnormal) Collected: 01/05/22 1043    Order Status: Completed Specimen: Blood Updated: 01/06/22 0713     Acc. no. from Micro Order [id number]     Staphylococcus Detected        Comment: RESULTS VERIFIED, PHONED TO AND READ BACK BY  BERTO JOSHI @ 0130 1/6/22 MM          Staphylococcus aureus Detected        Comment: RESULTS VERIFIED, PHONED TO AND READ BACK BY  BERTO JOSHI @ 0130 1/6/22 MM          mecA (Methicillin-Resistance Genes) Detected        Comment: Presence of mecA is highly indicative of methicillin resistance. The test does not replace traditional culture and susceptibilities        INTERPRETATION       Gram positive cocci in clusters, identified by realtime PCR as SUSPECTED MRSA. Comment: Recommend IV vancomycin use, unlikely to be a contaminant. Infectious Diseases Consult recommended in adult patients. THIS TEST DOES NOT REPLACE SENSITIVITY TESTING. Other Studies:  No results found.     Current Meds:  Current Facility-Administered Medications   Medication Dose Route Frequency    alcohol 62% (NOZIN) nasal  1 Ampule  1 Ampule Topical Q12H    vancomycin (VANCOCIN) 1250 mg in  ml infusion  1,250 mg IntraVENous Q12H    docusate sodium (COLACE) capsule 100 mg  100 mg Oral BID    HYDROmorphone (DILAUDID) injection 1 mg  1 mg IntraVENous Q4H PRN    oxyCODONE IR (ROXICODONE) tablet 10 mg  10 mg Oral Q4H PRN    influenza vaccine 2021-22 (6 mos+)(PF) (FLUARIX/FLULAVAL/FLUZONE QUAD) injection 0.5 mL  1 Each IntraMUSCular PRIOR TO DISCHARGE    pantothenic ac-min oil-pet,hyd (AQUAPHOR) 41 % ointment   Topical DAILY    melatonin tablet 5 mg  5 mg Oral QHS    diphenhydrAMINE (BENADRYL) capsule 25 mg  25 mg Oral QHS PRN    sodium chloride (NS) flush 5-10 mL  5-10 mL IntraVENous PRN    sodium chloride (NS) flush 5-40 mL  5-40 mL IntraVENous Q8H    sodium chloride (NS) flush 5-40 mL  5-40 mL IntraVENous PRN    acetaminophen (TYLENOL) tablet 650 mg  650 mg Oral Q6H PRN    Or    acetaminophen (TYLENOL) suppository 650 mg  650 mg Rectal Q6H PRN    polyethylene glycol (MIRALAX) packet 17 g  17 g Oral DAILY PRN    ondansetron (ZOFRAN ODT) tablet 4 mg  4 mg Oral Q8H PRN    Or    ondansetron (ZOFRAN) injection 4 mg  4 mg IntraVENous Q6H PRN    enoxaparin (LOVENOX) injection 40 mg  40 mg SubCUTAneous DAILY    benzonatate (TESSALON) capsule 100 mg  100 mg Oral TID PRN    guaiFENesin-dextromethorphan (ROBITUSSIN DM) 100-10 mg/5 mL syrup 10 mL  10 mL Oral Q4H PRN       Signed:  Peg Thrasher MD    Part of this note may have been written by using a voice dictation software. The note has been proof read but may still contain some grammatical/other typographical errors.

## 2022-01-10 LAB
ANION GAP SERPL CALC-SCNC: 6 MMOL/L (ref 7–16)
BASOPHILS # BLD: 0 K/UL (ref 0–0.2)
BASOPHILS NFR BLD: 1 % (ref 0–2)
BUN SERPL-MCNC: 19 MG/DL (ref 6–23)
CALCIUM SERPL-MCNC: 8.3 MG/DL (ref 8.3–10.4)
CHLORIDE SERPL-SCNC: 97 MMOL/L (ref 98–107)
CO2 SERPL-SCNC: 27 MMOL/L (ref 21–32)
CREAT SERPL-MCNC: 1.04 MG/DL (ref 0.8–1.5)
DIFFERENTIAL METHOD BLD: ABNORMAL
EOSINOPHIL # BLD: 0 K/UL (ref 0–0.8)
EOSINOPHIL NFR BLD: 1 % (ref 0.5–7.8)
ERYTHROCYTE [DISTWIDTH] IN BLOOD BY AUTOMATED COUNT: 13.6 % (ref 11.9–14.6)
GLUCOSE SERPL-MCNC: 105 MG/DL (ref 65–100)
HCT VFR BLD AUTO: 34.7 % (ref 41.1–50.3)
HGB BLD-MCNC: 11.6 G/DL (ref 13.6–17.2)
IMM GRANULOCYTES # BLD AUTO: 0.1 K/UL (ref 0–0.5)
IMM GRANULOCYTES NFR BLD AUTO: 1 % (ref 0–5)
LYMPHOCYTES # BLD: 0.4 K/UL (ref 0.5–4.6)
LYMPHOCYTES NFR BLD: 8 % (ref 13–44)
MCH RBC QN AUTO: 28.5 PG (ref 26.1–32.9)
MCHC RBC AUTO-ENTMCNC: 33.4 G/DL (ref 31.4–35)
MCV RBC AUTO: 85.3 FL (ref 79.6–97.8)
MONOCYTES # BLD: 0.4 K/UL (ref 0.1–1.3)
MONOCYTES NFR BLD: 7 % (ref 4–12)
NEUTS SEG # BLD: 4.3 K/UL (ref 1.7–8.2)
NEUTS SEG NFR BLD: 83 % (ref 43–78)
NRBC # BLD: 0 K/UL (ref 0–0.2)
PLATELET # BLD AUTO: 188 K/UL (ref 150–450)
PMV BLD AUTO: 10.2 FL (ref 9.4–12.3)
POTASSIUM SERPL-SCNC: 3.8 MMOL/L (ref 3.5–5.1)
RBC # BLD AUTO: 4.07 M/UL (ref 4.23–5.6)
SODIUM SERPL-SCNC: 130 MMOL/L (ref 138–145)
WBC # BLD AUTO: 5.2 K/UL (ref 4.3–11.1)

## 2022-01-10 PROCEDURE — 74011250636 HC RX REV CODE- 250/636: Performed by: STUDENT IN AN ORGANIZED HEALTH CARE EDUCATION/TRAINING PROGRAM

## 2022-01-10 PROCEDURE — 97116 GAIT TRAINING THERAPY: CPT

## 2022-01-10 PROCEDURE — 74011250636 HC RX REV CODE- 250/636: Performed by: INTERNAL MEDICINE

## 2022-01-10 PROCEDURE — 2709999900 HC NON-CHARGEABLE SUPPLY

## 2022-01-10 PROCEDURE — 74011250637 HC RX REV CODE- 250/637: Performed by: EMERGENCY MEDICINE

## 2022-01-10 PROCEDURE — 97165 OT EVAL LOW COMPLEX 30 MIN: CPT

## 2022-01-10 PROCEDURE — 85025 COMPLETE CBC W/AUTO DIFF WBC: CPT

## 2022-01-10 PROCEDURE — 74011250637 HC RX REV CODE- 250/637: Performed by: INTERNAL MEDICINE

## 2022-01-10 PROCEDURE — 97535 SELF CARE MNGMENT TRAINING: CPT

## 2022-01-10 PROCEDURE — 74011250637 HC RX REV CODE- 250/637: Performed by: STUDENT IN AN ORGANIZED HEALTH CARE EDUCATION/TRAINING PROGRAM

## 2022-01-10 PROCEDURE — 80048 BASIC METABOLIC PNL TOTAL CA: CPT

## 2022-01-10 PROCEDURE — 65270000029 HC RM PRIVATE

## 2022-01-10 PROCEDURE — 36415 COLL VENOUS BLD VENIPUNCTURE: CPT

## 2022-01-10 PROCEDURE — 97161 PT EVAL LOW COMPLEX 20 MIN: CPT

## 2022-01-10 PROCEDURE — 74011000250 HC RX REV CODE- 250: Performed by: STUDENT IN AN ORGANIZED HEALTH CARE EDUCATION/TRAINING PROGRAM

## 2022-01-10 RX ORDER — AMLODIPINE BESYLATE 5 MG/1
5 TABLET ORAL DAILY
Status: DISCONTINUED | OUTPATIENT
Start: 2022-01-11 | End: 2022-01-11

## 2022-01-10 RX ORDER — FACIAL-BODY WIPES
10 EACH TOPICAL DAILY PRN
Status: DISCONTINUED | OUTPATIENT
Start: 2022-01-10 | End: 2022-01-12 | Stop reason: HOSPADM

## 2022-01-10 RX ADMIN — VANCOMYCIN HYDROCHLORIDE 1250 MG: 10 INJECTION, POWDER, LYOPHILIZED, FOR SOLUTION INTRAVENOUS at 20:53

## 2022-01-10 RX ADMIN — ACETAMINOPHEN 650 MG: 325 TABLET ORAL at 23:41

## 2022-01-10 RX ADMIN — ACETAMINOPHEN 650 MG: 325 TABLET ORAL at 16:51

## 2022-01-10 RX ADMIN — SODIUM CHLORIDE, PRESERVATIVE FREE 10 ML: 5 INJECTION INTRAVENOUS at 20:58

## 2022-01-10 RX ADMIN — BISACODYL 10 MG: 10 SUPPOSITORY RECTAL at 18:17

## 2022-01-10 RX ADMIN — OXYCODONE 10 MG: 5 TABLET ORAL at 21:06

## 2022-01-10 RX ADMIN — ENOXAPARIN SODIUM 40 MG: 100 INJECTION SUBCUTANEOUS at 08:50

## 2022-01-10 RX ADMIN — Medication: at 09:00

## 2022-01-10 RX ADMIN — SODIUM CHLORIDE, PRESERVATIVE FREE 10 ML: 5 INJECTION INTRAVENOUS at 13:43

## 2022-01-10 RX ADMIN — Medication 1 AMPULE: at 20:53

## 2022-01-10 RX ADMIN — Medication 5 MG: at 20:52

## 2022-01-10 RX ADMIN — VANCOMYCIN HYDROCHLORIDE 1250 MG: 10 INJECTION, POWDER, LYOPHILIZED, FOR SOLUTION INTRAVENOUS at 08:51

## 2022-01-10 RX ADMIN — SODIUM CHLORIDE, PRESERVATIVE FREE 10 ML: 5 INJECTION INTRAVENOUS at 05:29

## 2022-01-10 RX ADMIN — DOCUSATE SODIUM 100 MG: 100 CAPSULE ORAL at 18:17

## 2022-01-10 RX ADMIN — OXYCODONE 10 MG: 5 TABLET ORAL at 12:01

## 2022-01-10 RX ADMIN — DOCUSATE SODIUM 100 MG: 100 CAPSULE ORAL at 08:51

## 2022-01-10 RX ADMIN — Medication 1 AMPULE: at 08:51

## 2022-01-10 NOTE — PROGRESS NOTES
Infectious Disease Progress Note    Today's Date: 1/10/2022   Admit Date: 1/5/2022    Impression:   · MRSA and CoNS bacteremia (1/5) secondary to skin and soft tissue infection; TTE negative but has evidence of septic lung emboli, repeat BC 1/8 NTD pending  · RUE cellulitis  · Right basilic vein thrombosis  · Septic pulmonary emboli  · Folliculitis, back     Plan:     · Continue Vancomycin dose has been adjusted for low VT, planning 6 weeks from negative BC  · Ok to place PICC  · Hibiclens bath for folliculitis   · Dispo: unclear, he is uninsured and will need assistance for outpatient IV antbx. Would prefer to leave on Vancomycin, currently scheduled Q12    Anti-infectives:   · Clinda 1/5  · Zosyn 1/5-1/6  · Vanc 1/5-    Subjective: Interval History:   Up at the sink with OT, seen with wife at bedside. Still with 2LNC, sats stable but reports some SOB/HOOK. No fever, chills, sweats. Patient is a 64 y.o. male who presented to ED on 1/5/22 with pain and swelling to right elbow. 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 He reported that evening he began itching and noticed 2 lesions appearing on his right elbow. Since then he has had increasing pain and erythema as well as fever. CRP 26, Tmax 101.7, procal 2.3. CT of RUE with findings consistent with cellulitis without abscess. US revealed complete thrombosis of his right basilic vein. He was started on Vanc/Zosyn. Blood cultures obtained now + for CoNS, ID as staph. ID has been consulted for assistance. He has no PMH and NKA. Today he is seen on supplemental O2 and reports chest pain. No Known Allergies     Review of Systems:  A comprehensive review of systems was negative except for that written in the History of Present Illness.     Objective:     Visit Vitals  BP (!) 156/77 (BP 1 Location: Left upper arm, BP Patient Position: Supine)   Pulse 79   Temp 98.6 °F (37 °C)   Resp 16   Ht 6' (1.829 m)   Wt 99.3 kg (218 lb 14.7 oz)   SpO2 94%   BMI 29.69 kg/m²     Temp (24hrs), Av.8 °F (37.1 °C), Min:98 °F (36.7 °C), Max:100.2 °F (37.9 °C)     Patient examined 1/10/2022, exam remains unchanged except as noted below:    General:  Alert, cooperative, no acute distress   Head:  Normocephalic, atraumatic    Eyes:  Anicteric, no drainage/not injected   Throat: Mucus membranes moist OP clear, dentition poor   Neck: Supple, symmetrical, trachea midline, no JVD   Lungs:   Clear throughout lung fields, no increased work of breathing   Heart:  Regular rate and rhythm, without murmur   Abdomen:   Soft, non-tender, no guarding, no distention, bowel sounds active   Extremities: R arm, edema appears to be improved. No cellulitis, elbow wound covered   Skin: Back with raised lesions     Lines/Devices: PIV           Data Review:     CBC:  Recent Labs     01/10/22  0519 01/09/22  0554   WBC 5.2 4.9   GRANS 83* 70   MONOS 7 11   EOS 1 1   ANEU 4.3 3.4   ABL 0.4* 0.8   HGB 11.6* 11.2*   HCT 34.7* 34.7*    132*       BMP:  Recent Labs     01/10/22  0519 01/09/22  0554 22   CREA 1.04 1.02 1.08   BUN 19 14  --    * 133*  --    K 3.8 3.9  --    CL 97* 99  --    CO2 27 30  --    AGAP 6* 4*  --    * 91  --        LFTS:  No results for input(s): TBILI, ALT, AP, TP, ALB in the last 72 hours.     No lab exists for component: SGOT    Microbiology:     All Micro Results     Procedure Component Value Units Date/Time    CULTURE, BLOOD [[id number]] Collected: 22    Order Status: Completed Specimen: Blood Updated: 01/10/22 0716     Special Requests: --        LEFT  Antecubital       Culture result: NO GROWTH 2 DAYS       CULTURE, BLOOD [[id number]] Collected: 22    Order Status: Completed Specimen: Blood Updated: 01/10/22 0716     Special Requests: --        LEFT  HAND       Culture result: NO GROWTH 2 DAYS       BLOOD CULTURE [[id number]]  (Abnormal) Collected: 22 1228    Order Status: Completed Specimen: Blood Updated: 01/08/22 0749     Special Requests: --        LEFT  HAND       GRAM STAIN GRAM POSITIVE COCCI               AEROBIC AND ANAEROBIC BOTTLES                  CRITICAL RESULT NOT CALLED DUE TO PREVIOUS NOTIFICATION OF CRITICAL RESULT WITHIN THE LAST 24 HOURS. Culture result: STAPHYLOCOCCUS AUREUS               For Susceptibility Refer to Culture  Accession [id number]      BLOOD CULTURE [[id number]]  (Abnormal)  (Susceptibility) Collected: 01/05/22 1043    Order Status: Completed Specimen: Blood Updated: 01/08/22 0748     Special Requests: --        RIGHT  ARM       GRAM STAIN GRAM POSITIVE COCCI               AEROBIC AND ANAEROBIC BOTTLES                  RESULTS VERIFIED, PHONED TO AND READ BACK BY BERTO JOSHI @ 0130 1/6/22 MM           Culture result:       * METHICILLIN RESISTANT STAPHYLOCOCCUS AUREUS *                  Refer to Blood Culture ID Panel Accession  [id number]              RESULTS VERIFIED, PHONED TO AND READ BACK BY  Nanette Babinski, RN ON 1/8/22 @0746,       COVID-19 RAPID TEST [[id number]] Collected: 01/06/22 0859    Order Status: Completed Specimen: Nasopharyngeal Updated: 01/06/22 0932     Specimen source NASAL        COVID-19 rapid test Not detected        Comment:      The specimen is NEGATIVE for SARS-CoV-2, the novel coronavirus associated with COVID-19. A negative result does not rule out COVID-19. This test has been authorized by the FDA under an Emergency Use Authorization (EUA) for use by authorized laboratories.         Fact sheet for Healthcare Providers: ConventionUpdate.co.nz  Fact sheet for Patients: ConventionUpdate.co.nz       Methodology: Isothermal Nucleic Acid Amplification         BLOOD CULTURE ID PANEL [[id number]]  (Abnormal) Collected: 01/05/22 1043    Order Status: Completed Specimen: Blood Updated: 01/06/22 0713     Acc. no. from Micro Order [id number]     Staphylococcus Detected        Comment: RESULTS VERIFIED, PHONED TO AND READ BACK BY  BERTO JOSHI @ 3001 1/6/22 MM          Staphylococcus aureus Detected        Comment: RESULTS VERIFIED, PHONED TO AND READ BACK BY  BERTO JOSHI @ 9800 1/6/22 MM          mecA (Methicillin-Resistance Genes) Detected        Comment: Presence of mecA is highly indicative of methicillin resistance. The test does not replace traditional culture and susceptibilities        INTERPRETATION       Gram positive cocci in clusters, identified by realtime PCR as SUSPECTED MRSA. Comment: Recommend IV vancomycin use, unlikely to be a contaminant. Infectious Diseases Consult recommended in adult patients. THIS TEST DOES NOT REPLACE SENSITIVITY TESTING.              Imaging:   Reviewed    Signed By: Cata Goodman NP     January 10, 2022

## 2022-01-10 NOTE — PROGRESS NOTES
ACUTE PHYSICAL THERAPY GOALS:  (Developed with and agreed upon by patient and/or caregiver.)    (1.) Leigh Queen will transfer from bed to chair and chair to bed with INDEPENDENCE using the least restrictive device within 7 treatment day(s). (2.) Bertram Isaac will ambulate with INDEPENDENCE for 500 feet with the least restrictive device within 7 treatment day(s). (3.) Bertram Isaac will perform standing static and dynamic balance activities x 20 minutes with SUPERVISION to improve safety within 7 treatment day(s). (4.) Bertram Isaac will ascend and descend 3 stairs using 0 hand rail(s) with INDEPENDENT to improve functional mobility and safety within 7 treatment day(s). (5.) Bertram Isaac will perform bilateral lower extremity exercises x 20 min for HEP with INDEPENDENCE to improve strength, endurance, and functional mobility within 7 treatment day(s). PHYSICAL THERAPY ASSESSMENT: Initial Assessment, Daily Note and AM PT Treatment Day # 1      Leigh Queen is a 64 y.o. male   PRIMARY DIAGNOSIS: Sepsis ([facility] [address]  Cellulitis [L03.90]       Reason for Referral:    ICD-10: Treatment Diagnosis: Difficulty in walking, Not elsewhere classified (R26.2)  INPATIENT: Payor: /     ASSESSMENT:     REHAB RECOMMENDATIONS:   Recommendation to date pending progress:  [facility] [place] [address] Therapy  Equipment:    To Be Determined     PRIOR LEVEL OF FUNCTION:  (Prior to Hospitalization) INITIAL/CURRENT LEVEL OF FUNCTION:  (Most Recently Demonstrated)   Bed Mobility:   Independent  Sit to Stand:   Independent  Transfers:   Independent  Gait/Mobility:   Independent Bed Mobility:   Not tested  Sit to Stand:   Standby Assistance  Transfers:   Standby Assistance  Gait/Mobility:   Standby Assistance     ASSESSMENT:  Mr. Unique Lopez is a 64year old M who presents with R UE cellulitis, sepsis. At baseline, pt is independent, active. He presents today on 2L O2 via NC, does not wear O2 at baseline.  This date pt performs mobility including transfers,gait with SBA. He ambulated 40 ft with SBA and no AD, but with unsteadiness and fatigue noted. He did desat with ambulation on room air, recovered immediately when placed back on 2L. Pt presents as functioning below his baseline, with deficits in mobility including transfers, gait, balance, and activity tolerance. Pt will benefit from skilled therapy services to address stated deficits to promote return to highest level of function, independence, and safety. Will continue to follow. SUBJECTIVE:   Mr. Dion Gibson states, \"I haven't moved much since I've been here. \"    SOCIAL HISTORY/LIVING ENVIRONMENT: PLOF:ind, active, works in construction.  Lives with wife, 3 ZANDER, no falls    Support Systems: Spouse/Significant Other,Other Family Member(s) (sibling )  OBJECTIVE:     PAIN: VITAL SIGNS: LINES/DRAINS:   Pre Treatment: Pain Screen  Pain Scale 1: Numeric (0 - 10)  Pain Intensity 1: 0  Post Treatment: 0   IV  O2 Device: Nasal cannula     GROSS EVALUATION:  B LE Within Functional Limits Abnormal/ Functional Abnormal/ Non-Functional (see comments) Not Tested Comments:   AROM [x] [] [] []    PROM [] [] [] [x]    Strength [] [x] [] [] B Hip weakness   Balance [] [x] [] [] Good sitting, fair standing    Posture [x] [] [] []    Sensation [] [] [] [x]    Coordination [] [] [] [x]    Tone [] [] [] [x]    Edema [] [] [] [x]    Activity Tolerance [] [x] [] [] Well below baseline     [] [] [] []      COGNITION/  PERCEPTION: Intact Impaired   (see comments) Comments:   Orientation [x] []    Vision [x] []    Hearing [x] []    Command Following [x] []    Safety Awareness [x] []     [] []      MOBILITY: I Mod I S SBA CGA Min Mod Max Total  NT x2 Comments:   Bed Mobility    Rolling [] [] [] [] [] [] [] [] [] [x] [] Pt in chair on arrival   Supine to Sit [] [] [] [] [] [] [] [] [] [x] []    Scooting [] [] [] [] [] [] [] [] [] [x] []    Sit to Supine [] [] [] [] [] [] [] [] [] [x] []    Transfers    Sit to Stand [] [] [] [x] [] [] [] [] [] [] []    Bed to Chair [] [] [] [x] [] [] [] [] [] [] []    Stand to Sit [] [] [] [x] [] [] [] [] [] [] []    I=Independent, Mod I=Modified Independent, S=Supervision, SBA=Standby Assistance, CGA=Contact Guard Assistance,   Min=Minimal Assistance, Mod=Moderate Assistance, Max=Maximal Assistance, Total=Total Assistance, NT=Not Tested  GAIT: I Mod I S SBA CGA Min Mod Max Total  NT x2 Comments:   Level of Assistance [] [] [] [x] [] [] [] [] [] [] []    Distance 40 ft    DME None    Gait Quality Decreased gait speed, unsteady    Weightbearing Status N/A     I=Independent, Mod I=Modified Independent, S=Supervision, SBA=Standby Assistance, CGA=Contact Guard Assistance,   Min=Minimal Assistance, Mod=Moderate Assistance, Max=Maximal Assistance, Total=Total Assistance, NT=Not Tested    [facility] Form       How much difficulty does the patient currently have. .. Unable A Lot A Little None   1. Turning over in bed (including adjusting bedclothes, sheets and blankets)? [] 1   [] 2   [] 3   [x] 4   2. Sitting down on and standing up from a chair with arms ( e.g., wheelchair, bedside commode, etc.)   [] 1   [] 2   [x] 3   [] 4   3. Moving from lying on back to sitting on the side of the bed? [] 1   [] 2   [] 3   [x] 4   How much help from another person does the patient currently need. .. Total A Lot A Little None   4. Moving to and from a bed to a chair (including a wheelchair)? [] 1   [] 2   [x] 3   [] 4   5. Need to walk in hospital room? [] 1   [] 2   [x] 3   [] 4   6. Climbing 3-5 steps with a railing? [] 1   [] 2   [x] 3   [] 4   © 2007, Trustees of [facility], under license to [employer]. All rights reserved     Score:  Initial: 20 Most Recent: X (Date: -- )    Interpretation of Tool:  Represents activities that are increasingly more difficult (i.e. Bed mobility, Transfers, Gait).     PLAN: FREQUENCY/DURATION: PT Plan of Care: 3 times/week for duration of hospital stay or until stated goals are met, whichever comes first.    PROBLEM LIST:   (Skilled intervention is medically necessary to address:)  1. Decreased ADL/Functional Activities  2. Decreased Activity Tolerance  3. Decreased Balance  4. Decreased Gait Ability  5. Decreased Strength  6. Decreased Transfer Abilities   INTERVENTIONS PLANNED:   (Benefits and precautions of physical therapy have been discussed with the patient.)  1. Therapeutic Activity  2. Therapeutic Exercise/HEP  3. Neuromuscular Re-education  4. Gait Training  5. Education     TREATMENT:     EVALUATION: Low Complexity : (Untimed Charge)    TREATMENT:   ($$ Gait Trainin-22 mins    )  Gait Training (8 Minutes): Gait training for 40 feet utilizing None. Patient required Tactile, Verbal and Visual cueing to improve Activity Pacing and Dynamic Standing Balance.      TREATMENT GRID:  N/A    AFTER TREATMENT POSITION/PRECAUTIONS:  Chair, Needs within reach, RN notified and Visitors at bedside    INTERDISCIPLINARY COLLABORATION:  RN/PCT    TOTAL TREATMENT DURATION:  PT Patient Time In/Time Out  Time In: 44  Time Out: [address] PT

## 2022-01-10 NOTE — PROGRESS NOTES
Chart reviewed. PT recommending HH and oT recommending no skilled needs. ID recommending 6 weeks IV ABT of Vanc after negative BC. PICC lines to be placed. Pt has no insurance so options are extremely limited. Contacted outpatient infusion center and this is not an option as infusion center can only take daily infusions. Contacted [facility] and sent referred to get pricing for infusions. Will follow up with pt once cost provided. CM to continue to follow and monitor for any further needs. Update 1444: [facility] for [facility] will be $62 per day/$434 per week/ $6957 for entire duration. Spoke to pt and pt agreeable to cost if a payment plan can be worked out as he cannot come up with this amount up front. [facility] plans to contact pt for options with payments. Spouse agreeable to learning how to administer IV ABT with training. Pt aware HH will need to follow as well to manage PICC line and assist as needed with ABT. Pt would like to use [facility] [place] as he has already completed and submitted sponsorship paperwork for assistance. [facility] closer to d/c as waiting on final wound cx. Update 1653: Received return call from [facility] and pt/spouse agreed to 8 month payment plan. Son concerned about cost but spouse assures they will make payment plan work. Will follow up with [facility] when blood cx are complete.

## 2022-01-10 NOTE — PROGRESS NOTES
Physician Progress Note      Chava Bronson  [facility] #:                  [id number]  :                       1965  ADMIT DATE:       2022 11:17 AM  DISCH DATE:  RESPONDING  PROVIDER #:        Lexus VARGAS MD        QUERY TEXT:    Type of Anemia: Please provide further specificity, if known. Clinical indicators include: anemia, rbc, hgb, hct, platelet, normocytic/normochromic  Options provided:  -- Anemia due to acute blood loss  -- Anemia due to chronic blood loss  -- Anemia due to iron deficiency  -- Anemia due to postoperative blood loss  -- Anemia due to chronic disease  -- Other - I will add my own diagnosis  -- Disagree - Not applicable / Not valid  -- Disagree - Clinically Unable to determine / Unknown        PROVIDER RESPONSE TEXT:    The patient has anemia due to chronic disease.       Electronically signed by:  Lexus VARGAS MD 1/10/2022 4:37 PM

## 2022-01-10 NOTE — PROGRESS NOTES
ACUTE OT GOALS:  (Developed with and agreed upon by patient and/or caregiver.)  1) Patient will complete total body bathing and dressing with MOD I and adaptive equipment as needed. 2) Patient will complete toileting INDEPENDENTLY. 3) Patient will complete functional transfers INDEPENDENTLY. 4) Patient will tolerate at least 25 minutes of OT activity with 1-2 rest breaks while maintaining O2 sats >90%. 5) Patient will verbalize at least 3 energy conservation technique to utilize during ADL/IADL. Timeframe: 7 visits     OCCUPATIONAL THERAPY ASSESSMENT: Initial Assessment and Daily Note OT Treatment Day # 1    Marvel Barone is a 64 y.o. male   PRIMARY DIAGNOSIS: Sepsis ([facility] [address]  Cellulitis [L03.90]       Reason for Referral:    ICD-10: Treatment Diagnosis: Generalized Muscle Weakness (M62.81)  Difficulty in walking, Not elsewhere classified (R26.2)  Other abnormalities of gait and mobility (R26.89)  INPATIENT: Payor: /   ASSESSMENT:     REHAB RECOMMENDATIONS:   Recommendation to date pending progress:  Setting:   No further skilled therapy   Equipment:    To Be Determined pt has SC at home     PRIOR LEVEL OF FUNCTION:  (Prior to Hospitalization)  INITIAL/CURRENT LEVEL OF FUNCTION:  (Based on today's evaluation)   Bathing:   Independent  Dressing:   Independent  Feeding/Grooming:   Independent  Toileting:   Independent  Functional Mobility:   Independent Bathing:   Contact Guard Assistance  Dressing:  Theodoro Milder Standby Assistance  Feeding/Grooming:   Standby Assistance  Toileting:   Standby Assistance  Functional Mobility:   Standby Assistance     ASSESSMENT:  Mr. Allyne Runner is a 63 y/o male presents with sepsis and cellulitis of R elbow and has new onset of SOB. Pt does not wear home O2 and is currently on 2L O2 NC. Today pt presents with decreased activity tolerance and balance impacting ADLs. Pt reports he has not been moving much since admission and feels weak.  Pt overall SBA no AD for functional transfers and mobility. Pt had 1 LOB, therapist corrected and stated that he was feeling wobbly due to not getting up much. Pt completed grooming ADLs at sink with fair+ balance and O2 sats >95% throughout. Pt educated on energy conservation techniques and pursed lip breathing when feeling SOB. Pt encouraged to sit in bedside chair to facilitate increased mobility and activity tolerance. Pt is currently functioning below baseline and would benefit from skilled OT services to address OT goals and plan of care. SUBJECTIVE:   Mr. Marcus Cornejo states, \"I just haven't been up much. \"    SOCIAL HISTORY/LIVING ENVIRONMENT: lives with wife, 2 level home with all needs on 1 level, walk in shower w/ SC, no falls, works, drives, no home O2  Support Systems: Spouse/Significant Other,Other Family Member(s) (sibling )    OBJECTIVE:     PAIN: VITAL SIGNS: LINES/DRAINS:   Pre Treatment: Pain Screen  Pain Scale 1: Numeric (0 - 10)  Pain Intensity 1: 0  Post Treatment: 0 Vital Signs  O2 Sat (%): 97 %  O2 Device: Nasal cannula  O2 Flow Rate (L/min): 2 l/min IV  O2 Device: Nasal cannula     GROSS EVALUATION:  BUE Within Functional Limits Abnormal/ Functional Abnormal/ Non-Functional (see comments) Not Tested Comments:   AROM [x] [] [] []    PROM [] [] [] [x]    Strength [x] [] [] []    Balance [] [x] [] [] Sitting: good  Standing: fair+   Posture [x] [] [] []    Sensation [x] [] [] []    Coordination [x] [] [] []    Tone [] [] [] [x]    Edema [] [x] [] [] RUE at elbow due to infection   Activity Tolerance [] [x] [] [] 2L O2 NC, SOB with activity    [] [] [] []      COGNITION/  PERCEPTION: Intact Impaired   (see comments) Comments:   Orientation [x] []    Vision [x] []    Hearing [x] []    Judgment/ Insight [x] []    Attention [x] []    Memory [x] []    Command Following [x] []    Emotional Regulation [x] []     [] []      ACTIVITIES OF DAILY LIVING: I Mod I S SBA CGA Min Mod Max Total NT Comments   BASIC ADLs:              Bathing/ Showering [] [] [] [] [] [] [] [] [] [x]    Toileting [] [] [] [] [] [] [] [] [] [x]    Dressing [] [] [] [x] [] [] [] [] [] [] Donning slip on shoes EOB   Feeding [] [] [] [] [] [] [] [] [] [x]    Grooming [] [] [] [x] [] [] [] [] [] [] Brushing teeth and washing face standing at sink   Personal Device Care [] [] [] [] [] [] [] [] [] [x]    Functional Mobility [] [] [] [x] [] [] [] [] [] [] No AD   I=Independent, Mod I=Modified Independent, S=Supervision, SBA=Standby Assistance, CGA=Contact Guard Assistance,   Min=Minimal Assistance, Mod=Moderate Assistance, Max=Maximal Assistance, Total=Total Assistance, NT=Not Tested    MOBILITY: I Mod I S SBA CGA Min Mod Max Total  NT x2 Comments:   Supine to sit [x] [] [] [] [] [] [] [] [] [] []    Sit to supine [] [] [] [] [] [] [] [] [] [x] []    Sit to stand [] [] [] [x] [] [] [] [] [] [] [] No AD   Bed to chair [] [] [] [x] [] [] [] [] [] [] [] No AD   I=Independent, Mod I=Modified Independent, S=Supervision, SBA=Standby Assistance, CGA=Contact Guard Assistance,   Min=Minimal Assistance, Mod=Moderate Assistance, Max=Maximal Assistance, Total=Total Assistance, NT=Not Tested    [address] [id number] AM-PAC 6 Clicks   Daily Activity Inpatient Short Form        How much help from another person does the patient currently need. .. Total A Lot A Little None   1. Putting on and taking off regular lower body clothing? [] 1   [] 2   [] 3   [x] 4   2. Bathing (including washing, rinsing, drying)? [] 1   [] 2   [x] 3   [] 4   3. Toileting, which includes using toilet, bedpan or urinal?   [] 1   [] 2   [x] 3   [] 4   4. Putting on and taking off regular upper body clothing? [] 1   [] 2   [] 3   [x] 4   5. Taking care of personal grooming such as brushing teeth? [] 1   [] 2   [] 3   [x] 4   6. Eating meals? [] 1   [] 2   [] 3   [x] 4   © 2007, Trustees of [address] [place] [address] [address] [address], under license to [facility].  All rights reserved     Score:  Initial: 22 Most Recent: X (Date: -- ) Interpretation of Tool:  Represents activities that are increasingly more difficult (i.e. Bed mobility, Transfers, Gait). PLAN:   FREQUENCY/DURATION: OT Plan of Care: 3 times/week for duration of hospital stay or until stated goals are met, whichever comes first.    PROBLEM LIST:   (Skilled intervention is medically necessary to address:)  1. Decreased ADL/Functional Activities  2. Decreased Activity Tolerance  3. Decreased Balance  4. Decreased Transfer Abilities   INTERVENTIONS PLANNED:   (Benefits and precautions of occupational therapy have been discussed with the patient.)  1. Self Care Training  2. Therapeutic Activity  3. Therapeutic Exercise/HEP  4. Neuromuscular Re-education  5. Education     TREATMENT:     EVALUATION: Low Complexity : (Untimed Charge)    TREATMENT:   ($$ Self Care/Home Management: 8-22 mins    )  Self Care (19 Minutes): Self care including Lower Body Dressing, Grooming, Energy Conservation Training and functional transfers in prep for ADLs to increase independence and decrease level of assistance required.     TREATMENT GRID:  N/A    AFTER TREATMENT POSITION/PRECAUTIONS:  Chair, Needs within reach, RN notified and Visitors at bedside    INTERDISCIPLINARY COLLABORATION:  RN/PCT and OT/SALDIVAR    TOTAL TREATMENT DURATION:  OT Patient Time In/Time Out  Time In: 4046  Time Out: 1011 Marc Pierce, OT [place]

## 2022-01-10 NOTE — PROGRESS NOTES
Hospitalist Progress Note   Admit Date:  2022 11:17 AM   Name:  Brayan Connor   Age:  64 y.o. Sex:  male  :  1965   MRN:  [id number]   Room:  [place] [address]    Presenting Complaint: Blood infection    Reason(s) for Admission: Cellulitis [L03.90]     Hospital Course & Interval History:       Mr. Danielle Estrada is a 63 yo male without PMH admitted with RUE cellulitis. He is found with MRSA bacteremia and RUE basilic thrombus. CTA chest shows septic thrombus/RML cavitation. He is on prophylactic lovenox. He is on IV vancomycin. TTE no IE. ID following. PICC placement pending for longterm antibiotics. He had PETER that resolved with hydration. O2 weaning. discharge plans pending. Subjective (01/10/22):     Has rash on back, has LUE edema/ pain, some dyspnea, not getting out of bed too much, no BM, eating ok , generally weak, no prior HTN issues     Assessment & Plan:     Principal Problem:    Sepsis ([address] (2022)  Active Problems:    Cellulitis (2022)   Bacteremia due to Gram-positive bacteria (2022)  · Continued IV vancomycin  · ID appreciated  · Repeat [facility] 22 pending   · PICC for outpatient antibiotics   · .5- monitor fever curve and need for further workup          Septic embolism ([facility] (3/5/5368)    Basilic vein thrombosis (2022)  · Continued lovenox  · Elevate RUE        Chest pain (2022)   pleuritic pain  · Continued dilaudid dose and  as needed oral oxycodone  · Wean O2 as tolerant, on 2 L NC          PETER (acute kidney injury) ([facility] (2022)  ·   Resolved  · Stopped  IVF 22           Hyponatremia (2022)  · Trend with BMP,  130        Anemia:  Thrombocytopenia:  · Trend CBC, improving 1-10-22       Elevated total bilirubin:  · Resolved       HTN:  · start norvasc       Folliculitis:  · Hibiclens bath      Constipation:  · Add dulcolax to colace and miralax        Dispo/Discharge Planning:    Pending     Diet:  ADULT DIET Regular  DVT PPx:lovenox  Code status: Full Code    Hospital Problems as of 1/10/2022 Never Reviewed          Codes Class Noted - Resolved POA    Bacteremia due to Gram-positive bacteria ICD-10-CM: R78.81  ICD-9-CM: 790.7  1/6/2022 - Present Unknown        Septic embolism ([facility] ICD-10-CM: O13  ICD-9-CM: 415.12  1/6/2022 - Present Unknown        Cellulitis ICD-10-CM: L03.90  ICD-9-CM: 682.9  1/5/2022 - Present Unknown        * (Principal) Sepsis ([facility] ICD-10-CM: A41.9  ICD-9-CM: 038.9, 995.91  1/5/2022 - Present Unknown        Basilic vein thrombosis ZWO-57-AX: I82.619  ICD-9-CM: 453.81  1/5/2022 - Present Unknown        Cough ICD-10-CM: R05.9  ICD-9-CM: 786.2  1/5/2022 - Present Unknown        Chest pain ICD-10-CM: R07.9  ICD-9-CM: 786.50  1/5/2022 - Present Unknown        PETER (acute kidney injury) ([facility] ICD-10-CM: N17.9  ICD-9-CM: 584.9  1/5/2022 - Present Unknown        Hyponatremia ICD-10-CM: E87.1  ICD-9-CM: 276.1  1/5/2022 - Present Unknown              Objective:     Patient Vitals for the past 24 hrs:   Temp Pulse Resp BP SpO2   01/10/22 1551 (!) 100.5 °F (38.1 °C) 93 18 (!) 146/86 98 %   01/10/22 1054 98.8 °F (37.1 °C) 79 18 (!) 152/73 96 %   01/10/22 0852     97 %   01/10/22 0744 98.6 °F (37 °C) 79 16 (!) 156/77 94 %   01/10/22 0311 100.2 °F (37.9 °C) 79 18 (!) 162/83 90 %   01/09/22 2257 99.1 °F (37.3 °C) 96 18 (!) 168/75 92 %     Oxygen Therapy  O2 Sat (%): 98 % (01/10/22 1551)  Pulse via Oximetry: 86 beats per minute (01/06/22 0000)  O2 Device: Nasal cannula (01/10/22 0852)  O2 Flow Rate (L/min): 2 l/min (01/10/22 0852)    Estimated body mass index is 29.69 kg/m² as calculated from the following:    Height as of this encounter: 6' (1.829 m). Weight as of this encounter: 99.3 kg (218 lb 14.7 oz). No intake or output data in the 24 hours ending 01/10/22 1619      Physical Exam:     Blood pressure (!) 146/86, pulse 93, temperature (!) 100.5 °F (38.1 °C), resp.  rate 18, height 6' (1.829 m), weight 99.3 kg (218 lb 14.7 oz), SpO2 98 %.  General:    Well nourished. No overt distress, alert   CV:   RRR. No m/r/g. No jugular venous distension. No edema  LE   Lungs:   CTAB. No wheezing, rhonchi, or rales. Respirations even, unlabored anterior   Abdomen: Bowel sounds present. Soft, nontender, nondistended. Extremities: Edematous RUE  Skin:     Folliculitis lower back   Neuro:  grossly intact. Psych:  Normal mood and affect. I have reviewed ordered lab tests and independently visualized imaging below:    Recent Labs:  Recent Results (from the past 48 hour(s))   CBC WITH AUTOMATED DIFF    Collection Time: 01/09/22  5:54 AM   Result Value Ref Range    WBC 4.9 4.3 - 11.1 K/uL    RBC 4.00 (L) 4.23 - 5.6 M/uL    HGB 11.2 (L) 13.6 - 17.2 g/dL    HCT 34.7 (L) 41.1 - 50.3 %    MCV 86.8 79.6 - 97.8 FL    MCH 28.0 26.1 - 32.9 PG    MCHC 32.3 31.4 - 35.0 g/dL    RDW 14.0 11.9 - 14.6 %    PLATELET 804 (L) 511 - 450 K/uL    MPV 11.1 9.4 - 12.3 FL    ABSOLUTE NRBC 0.00 0.0 - 0.2 K/uL    DF AUTOMATED      NEUTROPHILS 70 43 - 78 %    LYMPHOCYTES 17 13 - 44 %    MONOCYTES 11 4.0 - 12.0 %    EOSINOPHILS 1 0.5 - 7.8 %    BASOPHILS 1 0.0 - 2.0 %    IMMATURE GRANULOCYTES 1 0.0 - 5.0 %    ABS. NEUTROPHILS 3.4 1.7 - 8.2 K/UL    ABS. LYMPHOCYTES 0.8 0.5 - 4.6 K/UL    ABS. MONOCYTES 0.5 0.1 - 1.3 K/UL    ABS. EOSINOPHILS 0.0 0.0 - 0.8 K/UL    ABS. BASOPHILS 0.0 0.0 - 0.2 K/UL    ABS. IMM.  GRANS. 0.0 0.0 - 0.5 K/UL   METABOLIC PANEL, BASIC    Collection Time: 01/09/22  5:54 AM   Result Value Ref Range    Sodium 133 (L) 136 - 145 mmol/L    Potassium 3.9 3.5 - 5.1 mmol/L    Chloride 99 98 - 107 mmol/L    CO2 30 21 - 32 mmol/L    Anion gap 4 (L) 7 - 16 mmol/L    Glucose 91 65 - 100 mg/dL    BUN 14 6 - 23 MG/DL    Creatinine 1.02 0.8 - 1.5 MG/DL    GFR est AA >60 >60 ml/min/1.73m2    GFR est non-AA >60 >60 ml/min/1.73m2    Calcium 8.5 8.3 - 10.4 MG/DL   VANCOMYCIN, RANDOM    Collection Time: 01/09/22  5:54 AM   Result Value Ref Range    Vancomycin, random 9.9 UG/ML   CBC WITH AUTOMATED DIFF    Collection Time: 01/10/22  5:19 AM   Result Value Ref Range    WBC 5.2 4.3 - 11.1 K/uL    RBC 4.07 (L) 4.23 - 5.6 M/uL    HGB 11.6 (L) 13.6 - 17.2 g/dL    HCT 34.7 (L) 41.1 - 50.3 %    MCV 85.3 79.6 - 97.8 FL    MCH 28.5 26.1 - 32.9 PG    MCHC 33.4 31.4 - 35.0 g/dL    RDW 13.6 11.9 - 14.6 %    PLATELET 037 005 - 569 K/uL    MPV 10.2 9.4 - 12.3 FL    ABSOLUTE NRBC 0.00 0.0 - 0.2 K/uL    DF AUTOMATED      NEUTROPHILS 83 (H) 43 - 78 %    LYMPHOCYTES 8 (L) 13 - 44 %    MONOCYTES 7 4.0 - 12.0 %    EOSINOPHILS 1 0.5 - 7.8 %    BASOPHILS 1 0.0 - 2.0 %    IMMATURE GRANULOCYTES 1 0.0 - 5.0 %    ABS. NEUTROPHILS 4.3 1.7 - 8.2 K/UL    ABS. LYMPHOCYTES 0.4 (L) 0.5 - 4.6 K/UL    ABS. MONOCYTES 0.4 0.1 - 1.3 K/UL    ABS. EOSINOPHILS 0.0 0.0 - 0.8 K/UL    ABS. BASOPHILS 0.0 0.0 - 0.2 K/UL    ABS. IMM.  GRANS. 0.1 0.0 - 0.5 K/UL   METABOLIC PANEL, BASIC    Collection Time: 01/10/22  5:19 AM   Result Value Ref Range    Sodium 130 (L) 138 - 145 mmol/L    Potassium 3.8 3.5 - 5.1 mmol/L    Chloride 97 (L) 98 - 107 mmol/L    CO2 27 21 - 32 mmol/L    Anion gap 6 (L) 7 - 16 mmol/L    Glucose 105 (H) 65 - 100 mg/dL    BUN 19 6 - 23 MG/DL    Creatinine 1.04 0.8 - 1.5 MG/DL    GFR est AA >60 >60 ml/min/1.73m2    GFR est non-AA >60 >60 ml/min/1.73m2    Calcium 8.3 8.3 - 10.4 MG/DL       All Micro Results     Procedure Component Value Units Date/Time    CULTURE, BLOOD [[id number]] Collected: 01/08/22 0424    Order Status: Completed Specimen: Blood Updated: 01/10/22 0716     Special Requests: --        LEFT  Antecubital       Culture result: NO GROWTH 2 DAYS       CULTURE, BLOOD [[id number]] Collected: 01/08/22 0424    Order Status: Completed Specimen: Blood Updated: 01/10/22 0716     Special Requests: --        LEFT  HAND       Culture result: NO GROWTH 2 DAYS       BLOOD CULTURE [[id number]]  (Abnormal) Collected: 01/05/22 1228    Order Status: Completed Specimen: Blood Updated: 01/08/22 0749     Special Requests: --        LEFT  HAND       GRAM STAIN GRAM POSITIVE COCCI               AEROBIC AND ANAEROBIC BOTTLES                  CRITICAL RESULT NOT CALLED DUE TO PREVIOUS NOTIFICATION OF CRITICAL RESULT WITHIN THE LAST 24 HOURS. Culture result: STAPHYLOCOCCUS AUREUS               For Susceptibility Refer to Culture  Accession [id number]      BLOOD CULTURE [[id number]]  (Abnormal)  (Susceptibility) Collected: 01/05/22 1043    Order Status: Completed Specimen: Blood Updated: 01/08/22 0748     Special Requests: --        RIGHT  ARM       GRAM STAIN GRAM POSITIVE COCCI               AEROBIC AND ANAEROBIC BOTTLES                  RESULTS VERIFIED, PHONED TO AND READ BACK BY BERTO JOSHI @ 0130 1/6/22 MM           Culture result:       * METHICILLIN RESISTANT STAPHYLOCOCCUS AUREUS *                  Refer to Blood Culture ID Panel Accession  [id number]              RESULTS VERIFIED, PHONED TO AND READ BACK BY  Maria E Morgan RN ON 1/8/22 @0746,       COVID-19 RAPID TEST [[id number]] Collected: 01/06/22 0859    Order Status: Completed Specimen: Nasopharyngeal Updated: 01/06/22 0932     Specimen source NASAL        COVID-19 rapid test Not detected        Comment:      The specimen is NEGATIVE for SARS-CoV-2, the novel coronavirus associated with COVID-19. A negative result does not rule out COVID-19. This test has been authorized by the FDA under an Emergency Use Authorization (EUA) for use by authorized laboratories.         Fact sheet for Healthcare Providers: ConventionUpdate.co.nz  Fact sheet for Patients: ConventionUpdate.co.nz       Methodology: Isothermal Nucleic Acid Amplification         BLOOD CULTURE ID PANEL [[id number]]  (Abnormal) Collected: 01/05/22 1043    Order Status: Completed Specimen: Blood Updated: 01/06/22 0713     Acc. no. from Micro Order [id number]     Staphylococcus Detected        Comment: RESULTS VERIFIED, PHONED TO AND READ BACK BY  BERTO JOSHI @ 4949 1/6/22 MM          Staphylococcus aureus Detected        Comment: RESULTS VERIFIED, PHONED TO AND READ BACK BY  MADELYN,RN @ 4305 1/6/22 MM          mecA (Methicillin-Resistance Genes) Detected        Comment: Presence of mecA is highly indicative of methicillin resistance. The test does not replace traditional culture and susceptibilities        INTERPRETATION       Gram positive cocci in clusters, identified by realtime PCR as SUSPECTED MRSA. Comment: Recommend IV vancomycin use, unlikely to be a contaminant. Infectious Diseases Consult recommended in adult patients. THIS TEST DOES NOT REPLACE SENSITIVITY TESTING. Other Studies:  No results found.     Current Meds:  Current Facility-Administered Medications   Medication Dose Route Frequency    bisacodyL (DULCOLAX) suppository 10 mg  10 mg Rectal DAILY PRN    alcohol 62% (NOZIN) nasal  1 Ampule  1 Ampule Topical Q12H    vancomycin (VANCOCIN) 1250 mg in  ml infusion  1,250 mg IntraVENous Q12H    polyethylene glycol (MIRALAX) packet 17 g  17 g Oral DAILY    docusate sodium (COLACE) capsule 100 mg  100 mg Oral BID    HYDROmorphone (DILAUDID) injection 1 mg  1 mg IntraVENous Q4H PRN    oxyCODONE IR (ROXICODONE) tablet 10 mg  10 mg Oral Q4H PRN    influenza vaccine 2021-22 (6 mos+)(PF) (FLUARIX/FLULAVAL/FLUZONE QUAD) injection 0.5 mL  1 Each IntraMUSCular PRIOR TO DISCHARGE    pantothenic ac-min oil-pet,hyd (AQUAPHOR) 41 % ointment   Topical DAILY    melatonin tablet 5 mg  5 mg Oral QHS    diphenhydrAMINE (BENADRYL) capsule 25 mg  25 mg Oral QHS PRN    sodium chloride (NS) flush 5-10 mL  5-10 mL IntraVENous PRN    sodium chloride (NS) flush 5-40 mL  5-40 mL IntraVENous Q8H    sodium chloride (NS) flush 5-40 mL  5-40 mL IntraVENous PRN    acetaminophen (TYLENOL) tablet 650 mg  650 mg Oral Q6H PRN    Or    acetaminophen (TYLENOL) suppository 650 mg  650 mg Rectal Q6H PRN    polyethylene glycol (MIRALAX) packet 17 g 17 g Oral DAILY PRN    ondansetron (ZOFRAN ODT) tablet 4 mg  4 mg Oral Q8H PRN    Or    ondansetron (ZOFRAN) injection 4 mg  4 mg IntraVENous Q6H PRN    enoxaparin (LOVENOX) injection 40 mg  40 mg SubCUTAneous DAILY    benzonatate (TESSALON) capsule 100 mg  100 mg Oral TID PRN    guaiFENesin-dextromethorphan (ROBITUSSIN DM) 100-10 mg/5 mL syrup 10 mL  10 mL Oral Q4H PRN       Signed:  Chilo Pastrana MD    Part of this note may have been written by using a voice dictation software. The note has been proof read but may still contain some grammatical/other typographical errors.

## 2022-01-10 NOTE — PROGRESS NOTES
VANCO DAILY FOLLOW UP NOTE  [address] Pharmacokinetic Monitoring Service - Vancomycin    Consulting Provider: Dr. Quan Hearing   Indication: Bloodstream infection  Target Concentration: Goal AUC/MURRAY 400-600 mg*hr/L  Day of Therapy: 6  Additional Antimicrobials: none    Pertinent Laboratory Values: Wt Readings from Last 1 Encounters:   01/09/22 99.3 kg (218 lb 14.7 oz)     Temp Readings from Last 1 Encounters:   01/10/22 98.6 °F (37 °C)     No components found for: PROCAL  Recent Labs     01/10/22  0519 01/09/22  0554 01/08/22  0424   BUN 19 14  --    CREA 1.04 1.02 1.08   WBC 5.2 4.9  --      Estimated Creatinine Clearance: 96.8 mL/min (based on SCr of 1.04 mg/dL). Lab Results   Component Value Date/Time    Vancomycin, random 9.9 01/09/2022 05:54 AM     MRSA Nasal Swab: N/A. Non-respiratory infection. .    Assessment:  Date/Time Dose Concentration AUC   1/6 0429 1250 mg q24h 6.4 382   1/7 0047 1250 mg q18h 7.6 409   1/9 0554 1250 mg q18h 9.9 313   Note: Serum concentrations collected for AUC dosing may appear elevated if collected in close proximity to the dose administered, this is not necessarily an indication of toxicity    Plan:  Dose increased to 1250 mg q12h for predicted AUC/Tr of 464/14.1  Repeat vancomycin concentration ordered for 1/11 @ 0400  Pharmacy will continue to monitor patient and adjust therapy as indicated    Thank you for the consult,  Jeremy Palma, ROND

## 2022-01-11 ENCOUNTER — HOME HEALTH ADMISSION (OUTPATIENT)
Dept: HOME HEALTH SERVICES | Facility: HOME HEALTH | Age: 57
End: 2022-01-11

## 2022-01-11 LAB
ANION GAP SERPL CALC-SCNC: 8 MMOL/L (ref 7–16)
BASOPHILS # BLD: 0 K/UL (ref 0–0.2)
BASOPHILS NFR BLD: 1 % (ref 0–2)
BUN SERPL-MCNC: 16 MG/DL (ref 6–23)
CALCIUM SERPL-MCNC: 8.4 MG/DL (ref 8.3–10.4)
CHLORIDE SERPL-SCNC: 98 MMOL/L (ref 98–107)
CO2 SERPL-SCNC: 29 MMOL/L (ref 21–32)
CREAT SERPL-MCNC: 0.98 MG/DL (ref 0.8–1.5)
DIFFERENTIAL METHOD BLD: ABNORMAL
EOSINOPHIL # BLD: 0 K/UL (ref 0–0.8)
EOSINOPHIL NFR BLD: 1 % (ref 0.5–7.8)
ERYTHROCYTE [DISTWIDTH] IN BLOOD BY AUTOMATED COUNT: 13.5 % (ref 11.9–14.6)
GLUCOSE SERPL-MCNC: 110 MG/DL (ref 65–100)
HCT VFR BLD AUTO: 34.4 % (ref 41.1–50.3)
HGB BLD-MCNC: 11.5 G/DL (ref 13.6–17.2)
IMM GRANULOCYTES # BLD AUTO: 0.1 K/UL (ref 0–0.5)
IMM GRANULOCYTES NFR BLD AUTO: 3 % (ref 0–5)
LYMPHOCYTES # BLD: 0.4 K/UL (ref 0.5–4.6)
LYMPHOCYTES NFR BLD: 12 % (ref 13–44)
MCH RBC QN AUTO: 28.3 PG (ref 26.1–32.9)
MCHC RBC AUTO-ENTMCNC: 33.4 G/DL (ref 31.4–35)
MCV RBC AUTO: 84.5 FL (ref 79.6–97.8)
MONOCYTES # BLD: 0.3 K/UL (ref 0.1–1.3)
MONOCYTES NFR BLD: 9 % (ref 4–12)
NEUTS SEG # BLD: 2.7 K/UL (ref 1.7–8.2)
NEUTS SEG NFR BLD: 74 % (ref 43–78)
NRBC # BLD: 0 K/UL (ref 0–0.2)
PLATELET # BLD AUTO: 162 K/UL (ref 150–450)
PMV BLD AUTO: 10.2 FL (ref 9.4–12.3)
POTASSIUM SERPL-SCNC: 3.6 MMOL/L (ref 3.5–5.1)
RBC # BLD AUTO: 4.07 M/UL (ref 4.23–5.6)
SODIUM SERPL-SCNC: 135 MMOL/L (ref 136–145)
VANCOMYCIN SERPL-MCNC: 14.5 UG/ML
WBC # BLD AUTO: 3.6 K/UL (ref 4.3–11.1)

## 2022-01-11 PROCEDURE — 80202 ASSAY OF VANCOMYCIN: CPT

## 2022-01-11 PROCEDURE — 85025 COMPLETE CBC W/AUTO DIFF WBC: CPT

## 2022-01-11 PROCEDURE — 74011000250 HC RX REV CODE- 250: Performed by: INTERNAL MEDICINE

## 2022-01-11 PROCEDURE — 36573 INSJ PICC RS&I 5 YR+: CPT | Performed by: NURSE PRACTITIONER

## 2022-01-11 PROCEDURE — 74011250637 HC RX REV CODE- 250/637: Performed by: INTERNAL MEDICINE

## 2022-01-11 PROCEDURE — 65270000029 HC RM PRIVATE

## 2022-01-11 PROCEDURE — C1751 CATH, INF, PER/CENT/MIDLINE: HCPCS

## 2022-01-11 PROCEDURE — 74011250636 HC RX REV CODE- 250/636: Performed by: INTERNAL MEDICINE

## 2022-01-11 PROCEDURE — 74011250637 HC RX REV CODE- 250/637: Performed by: EMERGENCY MEDICINE

## 2022-01-11 PROCEDURE — 97110 THERAPEUTIC EXERCISES: CPT

## 2022-01-11 PROCEDURE — 80048 BASIC METABOLIC PNL TOTAL CA: CPT

## 2022-01-11 PROCEDURE — 74011000250 HC RX REV CODE- 250: Performed by: STUDENT IN AN ORGANIZED HEALTH CARE EDUCATION/TRAINING PROGRAM

## 2022-01-11 PROCEDURE — 36415 COLL VENOUS BLD VENIPUNCTURE: CPT

## 2022-01-11 PROCEDURE — 74011250636 HC RX REV CODE- 250/636: Performed by: STUDENT IN AN ORGANIZED HEALTH CARE EDUCATION/TRAINING PROGRAM

## 2022-01-11 RX ORDER — AMLODIPINE BESYLATE 10 MG/1
10 TABLET ORAL DAILY
Status: DISCONTINUED | OUTPATIENT
Start: 2022-01-12 | End: 2022-01-12 | Stop reason: HOSPADM

## 2022-01-11 RX ORDER — METOPROLOL TARTRATE 25 MG/1
12.5 TABLET, FILM COATED ORAL EVERY 12 HOURS
Status: DISCONTINUED | OUTPATIENT
Start: 2022-01-11 | End: 2022-01-12

## 2022-01-11 RX ORDER — HYDRALAZINE HYDROCHLORIDE 20 MG/ML
10 INJECTION INTRAMUSCULAR; INTRAVENOUS
Status: DISCONTINUED | OUTPATIENT
Start: 2022-01-11 | End: 2022-01-12 | Stop reason: HOSPADM

## 2022-01-11 RX ORDER — SODIUM CHLORIDE 0.9 % (FLUSH) 0.9 %
10 SYRINGE (ML) INJECTION EVERY 8 HOURS
Status: DISCONTINUED | OUTPATIENT
Start: 2022-01-11 | End: 2022-01-12 | Stop reason: HOSPADM

## 2022-01-11 RX ORDER — SODIUM CHLORIDE 0.9 % (FLUSH) 0.9 %
10 SYRINGE (ML) INJECTION AS NEEDED
Status: DISCONTINUED | OUTPATIENT
Start: 2022-01-11 | End: 2022-01-12 | Stop reason: HOSPADM

## 2022-01-11 RX ORDER — ENALAPRILAT 1.25 MG/ML
0.62 INJECTION INTRAVENOUS
Status: DISCONTINUED | OUTPATIENT
Start: 2022-01-11 | End: 2022-01-12 | Stop reason: HOSPADM

## 2022-01-11 RX ADMIN — HYDRALAZINE HYDROCHLORIDE 10 MG: 20 INJECTION INTRAMUSCULAR; INTRAVENOUS at 12:42

## 2022-01-11 RX ADMIN — VANCOMYCIN HYDROCHLORIDE 1250 MG: 10 INJECTION, POWDER, LYOPHILIZED, FOR SOLUTION INTRAVENOUS at 09:33

## 2022-01-11 RX ADMIN — AMLODIPINE BESYLATE 5 MG: 5 TABLET ORAL at 09:32

## 2022-01-11 RX ADMIN — ENALAPRILAT 0.62 MG: 2.5 INJECTION INTRAVENOUS at 17:01

## 2022-01-11 RX ADMIN — Medication 5 MG: at 20:41

## 2022-01-11 RX ADMIN — Medication: at 09:34

## 2022-01-11 RX ADMIN — Medication 1 AMPULE: at 20:38

## 2022-01-11 RX ADMIN — SODIUM CHLORIDE, PRESERVATIVE FREE 10 ML: 5 INJECTION INTRAVENOUS at 20:42

## 2022-01-11 RX ADMIN — SODIUM CHLORIDE, PRESERVATIVE FREE 10 ML: 5 INJECTION INTRAVENOUS at 13:17

## 2022-01-11 RX ADMIN — ENOXAPARIN SODIUM 40 MG: 100 INJECTION SUBCUTANEOUS at 09:33

## 2022-01-11 RX ADMIN — METOPROLOL TARTRATE 12.5 MG: 25 TABLET, FILM COATED ORAL at 20:40

## 2022-01-11 RX ADMIN — Medication 1 AMPULE: at 09:32

## 2022-01-11 RX ADMIN — VANCOMYCIN HYDROCHLORIDE 1250 MG: 10 INJECTION, POWDER, LYOPHILIZED, FOR SOLUTION INTRAVENOUS at 20:40

## 2022-01-11 RX ADMIN — DOCUSATE SODIUM 100 MG: 100 CAPSULE ORAL at 09:32

## 2022-01-11 RX ADMIN — HYDRALAZINE HYDROCHLORIDE 10 MG: 20 INJECTION INTRAMUSCULAR; INTRAVENOUS at 20:40

## 2022-01-11 RX ADMIN — SODIUM CHLORIDE, PRESERVATIVE FREE 10 ML: 5 INJECTION INTRAVENOUS at 04:09

## 2022-01-11 NOTE — PROGRESS NOTES
Infectious Disease Progress Note    Today's Date: 2022   Admit Date: 2022    Impression:   · MRSA and CoNS bacteremia () secondary to skin and soft tissue infection; TTE negative but has evidence of septic lung emboli, repeat BC  NTD pending  · RUE cellulitis  · Right basilic vein thrombosis  · Septic pulmonary emboli  · Folliculitis, back     Plan:     · Continue Vancomycin dose has been adjusted for low VT, planning 6 weeks from negative BC; EOT 22  · Ok to place PICC; [facility]  NGTD  · Hibiclens bath for folliculitis   · Dispo: patient to discharge home to continue IV Vancomycin    Anti-infectives:   · Clinda   · Zosyn -  · Vanc -    Subjective: Interval History:   Right elbow much improved; still on O2 but denied SOB or cough. No Known Allergies     Review of Systems:  A comprehensive review of systems was negative except for that written in the History of Present Illness. Objective:     Visit Vitals  BP (!) 177/80 (BP 1 Location: Left upper arm, BP Patient Position: Supine)   Pulse 74   Temp 98.4 °F (36.9 °C)   Resp 18   Ht 6' (1.829 m)   Wt 99.3 kg (218 lb 14.7 oz)   SpO2 98%   BMI 29.69 kg/m²     Temp (24hrs), Av.1 °F (37.3 °C), Min:97.9 °F (36.6 °C), Max:100.5 °F (38.1 °C)     Patient examined 2022, exam remains unchanged except as noted below:    General:  Alert, cooperative, no acute distress   Head:  Normocephalic, atraumatic    Eyes:  Anicteric, no drainage/not injected   Throat: Mucus membranes moist OP clear, dentition poor   Neck: Supple, symmetrical, trachea midline, no JVD   Lungs:   Clear throughout lung fields, no increased work of breathing   Heart:  Regular rate and rhythm, without murmur   Abdomen:   Soft, non-tender, no guarding, no distention, bowel sounds active   Extremities: R arm, edema appears to be improved.  No cellulitis, elbow wound covered   Skin: Back with raised lesions     Lines/Devices: PIV           Data Review: CBC:  Recent Labs     01/11/22  0519 01/10/22  0519 01/09/22  0554 01/09/22 0554   WBC 3.6* 5.2  --  4.9   GRANS 74 83*   < > 70   MONOS 9 7   < > 11   EOS 1 1   < > 1   ANEU 2.7 4.3   < > 3.4   ABL 0.4* 0.4*   < > 0.8   HGB 11.5* 11.6*  --  11.2*   HCT 34.4* 34.7*  --  34.7*    188  --  132*    < > = values in this interval not displayed. BMP:  Recent Labs     01/11/22  0519 01/10/22  0519 01/09/22  0554   CREA 0.98 1.04 1.02   BUN 16 19 14   * 130* 133*   K 3.6 3.8 3.9   CL 98 97* 99   CO2 29 27 30   AGAP 8 6* 4*   * 105* 91       LFTS:  No results for input(s): TBILI, ALT, AP, TP, ALB in the last 72 hours. No lab exists for component: SGOT    Microbiology:     All Micro Results     Procedure Component Value Units Date/Time    CULTURE, BLOOD [[id number]] Collected: 01/08/22 0424    Order Status: Completed Specimen: Blood Updated: 01/11/22 0846     Special Requests: --        LEFT  Antecubital       Culture result: NO GROWTH 3 DAYS       CULTURE, BLOOD [[id number]] Collected: 01/08/22 0424    Order Status: Completed Specimen: Blood Updated: 01/11/22 0846     Special Requests: --        LEFT  HAND       Culture result: NO GROWTH 3 DAYS       BLOOD CULTURE [[id number]]  (Abnormal) Collected: 01/05/22 1228    Order Status: Completed Specimen: Blood Updated: 01/08/22 0749     Special Requests: --        LEFT  HAND       GRAM STAIN GRAM POSITIVE COCCI               AEROBIC AND ANAEROBIC BOTTLES                  CRITICAL RESULT NOT CALLED DUE TO PREVIOUS NOTIFICATION OF CRITICAL RESULT WITHIN THE LAST 24 HOURS.            Culture result: STAPHYLOCOCCUS AUREUS               For Susceptibility Refer to Culture  Accession [id number]      BLOOD CULTURE [[id number]]  (Abnormal)  (Susceptibility) Collected: 01/05/22 1043    Order Status: Completed Specimen: Blood Updated: 01/08/22 0748     Special Requests: --        RIGHT  ARM       GRAM STAIN GRAM POSITIVE COCCI               AEROBIC AND ANAEROBIC BOTTLES                  RESULTS VERIFIED, PHONED TO AND READ BACK BY BERTO JOSHI @ 0130 1/6/22 MM           Culture result:       * METHICILLIN RESISTANT STAPHYLOCOCCUS AUREUS *                  Refer to Blood Culture ID Panel Accession  [id number]              RESULTS VERIFIED, PHONED TO AND READ BACK BY  Juan Carlos Urbano RN ON 1/8/22 @0746, TA      COVID-19 RAPID TEST [[id number]] Collected: 01/06/22 0859    Order Status: Completed Specimen: Nasopharyngeal Updated: 01/06/22 0932     Specimen source NASAL        COVID-19 rapid test Not detected        Comment:      The specimen is NEGATIVE for SARS-CoV-2, the novel coronavirus associated with COVID-19. A negative result does not rule out COVID-19. This test has been authorized by the FDA under an Emergency Use Authorization (EUA) for use by authorized laboratories. Fact sheet for Healthcare Providers: Nut[employer]ate.co.nz  Fact sheet for Patients: Nut[employer]ate.co.nz       Methodology: Isothermal Nucleic Acid Amplification         BLOOD CULTURE ID PANEL [[id number]]  (Abnormal) Collected: 01/05/22 1043    Order Status: Completed Specimen: Blood Updated: 01/06/22 0713     Acc. no. from Micro Order [id number]     Staphylococcus Detected        Comment: RESULTS VERIFIED, PHONED TO AND READ BACK BY  BERTO JOSHI @ 0130 1/6/22 MM          Staphylococcus aureus Detected        Comment: RESULTS VERIFIED, PHONED TO AND READ BACK BY  BERTO JOSHI @ 0130 1/6/22 MM          mecA (Methicillin-Resistance Genes) Detected        Comment: Presence of mecA is highly indicative of methicillin resistance. The test does not replace traditional culture and susceptibilities        INTERPRETATION       Gram positive cocci in clusters, identified by realtime PCR as SUSPECTED MRSA. Comment: Recommend IV vancomycin use, unlikely to be a contaminant. Infectious Diseases Consult recommended in adult patients.   THIS TEST DOES NOT REPLACE SENSITIVITY TESTING.              Imaging:   Reviewed    Signed By: Rodrigo Fisher MD     January 11, 2022

## 2022-01-11 NOTE — PROGRESS NOTES
ACUTE PHYSICAL THERAPY GOALS:  (Developed with and agreed upon by patient and/or caregiver.)  (1.) Katie Lozano will transfer from bed to chair and chair to bed with INDEPENDENCE using the least restrictive device within 7 treatment day(s). (2.) Bertram Martínez will ambulate with INDEPENDENCE for 500 feet with the least restrictive device within 7 treatment day(s). (3.) Bertram Martínez will perform standing static and dynamic balance activities x 20 minutes with SUPERVISION to improve safety within 7 treatment day(s). (4.) Bertram Martínez will ascend and descend 3 stairs using 0 hand rail(s) with INDEPENDENT to improve functional mobility and safety within 7 treatment day(s). (5.) Bertram Martínez will perform bilateral lower extremity exercises x 20 min for HEP with INDEPENDENCE to improve strength, endurance, and functional mobility within 7 treatment day(s). PHYSICAL THERAPY: Daily Note and PM Treatment Day # 2    Katie Lozano is a 64 y.o. male   PRIMARY DIAGNOSIS: Sepsis ([address]  Cellulitis [L03.90]         ASSESSMENT:     REHAB RECOMMENDATIONS: CURRENT LEVEL OF FUNCTION:  (Most Recently Demonstrated)   Recommendation to date pending progress:  [address] [place] [address] Therapy  Equipment:    None Bed Mobility:   Standby Assistance  Sit to Stand:  [employer] Assistance  Transfers:   Not tested  Gait/Mobility:   SBA-CGA     ASSESSMENT:  Mr. Rebecca Kirk was received supine in bed, agreeable to therapy. He progressed to being able to ambulate 100 ft with SBA-CGA, no AD. Some unsteadiness with turns- pt reports this is baseline. Pt introduced to several exercises for LE strengthening, balance- tolerated well. Bed mobility with SBA. Good progress, will continue to follow. SUBJECTIVE:   Mr. Rebecca Kirk states, \"this is hard enough. \"    SOCIAL HISTORY/ LIVING ENVIRONMENT: see eval  Support Systems: Spouse/Significant Other,Other Family Member(s) (sibling )  OBJECTIVE:     PAIN: VITAL SIGNS: LINES/DRAINS:   Pre Treatment: Pain Screen  Pain Scale 1: Numeric (0 - 10)  Pain Intensity 1: 0  Post Treatment: 0   none  O2 Device: None (Room air)     MOBILITY: I Mod I S SBA CGA Min Mod Max Total  NT x2 Comments:   Bed Mobility    Rolling [] [] [] [x] [] [] [] [] [] [] []    Supine to Sit [] [] [] [x] [] [] [] [] [] [] []    Scooting [] [] [] [x] [] [] [] [] [] [] []    Sit to Supine [] [] [] [x] [] [] [] [] [] [] []    Transfers    Sit to Stand [] [] [] [x] [] [] [] [] [] [] []    Bed to Chair [] [] [] [] [] [] [] [] [] [x] []    Stand to Sit [] [] [] [x] [] [] [] [] [] [] []    I=Independent, Mod I=Modified Independent, S=Supervision, SBA=Standby Assistance, CGA=Contact Guard Assistance,   Min=Minimal Assistance, Mod=Moderate Assistance, Max=Maximal Assistance, Total=Total Assistance, NT=Not Tested    BALANCE: Good Fair+ Fair Fair- Poor NT Comments   Sitting Static [x] [] [] [] [] []    Sitting Dynamic [x] [] [] [] [] []              Standing Static [x] [] [] [] [] []    Standing Dynamic [] [x] [] [] [] []      GAIT: I Mod I S SBA CGA Min Mod Max Total  NT x2 Comments:   Level of Assistance [] [] [] [x] [x] [] [] [] [] [] []    Distance 100 ft    DME None    Gait Quality Unsteady with turns    Weightbearing  Status N/A     I=Independent, Mod I=Modified Independent, S=Supervision, SBA=Standby Assistance, CGA=Contact Guard Assistance,   Min=Minimal Assistance, Mod=Moderate Assistance, Max=Maximal Assistance, Total=Total Assistance, NT=Not Tested    PLAN:   FREQUENCY/DURATION: PT Plan of Care: 3 times/week for duration of hospital stay or until stated goals are met, whichever comes first.  TREATMENT:     TREATMENT:   ($$ Therapeutic Exercises: 8-22 mins    )  Therapeutic Exercise (16 Minutes): Therapeutic exercises noted below to improve functional activity tolerance, strength and mobility.      TREATMENT GRID:   Date:  1/11/22 Date:   Date:     Activity/Exercise Parameters Parameters Parameters   LAQs 3 x 10     Seated Marching  3 x 10     STS 3 x 5                                   AFTER TREATMENT POSITION/PRECAUTIONS:  Bed, Needs within reach and RN notified    INTERDISCIPLINARY COLLABORATION:  RN/PCT    TOTAL TREATMENT DURATION:  PT Patient Time In/Time Out  Time In: 1501  Time Out: [address], PT

## 2022-01-11 NOTE — PROGRESS NOTES
VANCO DAILY FOLLOW UP NOTE  [address] [facility] Pharmacokinetic Monitoring Service - Vancomycin    Consulting Provider: Dr. Katarzyna Washington   Indication: Bloodstream infection  Target Concentration: Goal AUC/MURRAY 400-600 mg*hr/L  Day of Therapy: 7  Additional Antimicrobials: none    Pertinent Laboratory Values: Wt Readings from Last 1 Encounters:   01/09/22 99.3 kg (218 lb 14.7 oz)     Temp Readings from Last 1 Encounters:   01/11/22 98.4 °F (36.9 °C)     No components found for: PROCAL  Recent Labs     01/11/22  0519 01/10/22  0519 01/09/22  0554   BUN 16 19 14   CREA 0.98 1.04 1.02   WBC 3.6* 5.2 4.9     Estimated Creatinine Clearance: 102.7 mL/min (based on SCr of 0.98 mg/dL). Lab Results   Component Value Date/Time    Vancomycin, random 14.5 01/11/2022 05:19 AM     MRSA Nasal Swab: N/A. Non-respiratory infection. .    Assessment:  Date/Time Dose Concentration AUC   1/6 0429 1250 mg q24h 6.4 382   1/7 0047 1250 mg q18h 7.6 409   1/9 0554 1250 mg q18h 9.9 313   1/11 0519 1250 mg q12h 14.5 437   Note: Serum concentrations collected for AUC dosing may appear elevated if collected in close proximity to the dose administered, this is not necessarily an indication of toxicity    Plan:  Current dosing regimen is therapeutic  Continue current dose.  6 weeks planned from negative blood culture  Repeat vancomycin concentrations will be ordered as clinically appropriate  Pharmacy will continue to monitor patient and adjust therapy as indicated    Thank you for the consult,  Georgeanna Lanes, PHARMD

## 2022-01-11 NOTE — PROGRESS NOTES
Hospitalist Progress Note   Admit Date:  2022 11:17 AM   Name:  Katie Lozano   Age:  64 y.o. Sex:  male  :  1965   MRN:  [id number]   Room:  [place] [address]    Presenting Complaint: Blood infection    Reason(s) for Admission: Cellulitis [L03.90]     Hospital Course & Interval History:       Mr. Rebecca Kirk is a 65 yo male without PMH admitted with RUE cellulitis. He is found with MRSA bacteremia and RUE basilic thrombus. CTA chest shows septic thrombus/RML cavitation. He is on prophylactic lovenox. He is on IV vancomycin. TTE no IE. ID following. PICC placed pending for longterm antibiotics. EOT vancomycin 22. He had PETER that resolved with hydration. O2 weaning. discharge plans pending to home. Subjective (22):       Hypertensive, wants to go home, ate ok, had BM, arm less swollen, still painful though hallucinated on pain meds , .5, no dyspnea      Assessment & Plan:     Principal Problem:    Sepsis ([address] (2022)  Active Problems:    Cellulitis (2022)   Bacteremia due to Gram-positive bacteria (2022)  · Continued IV vancomycin, EOT 22  · ID appreciated  · Repeat BC 22 NGTD  · PICC for outpatient antibiotics   · .5- monitor fever curve and need for further workup  · Plans home with outpatient IV vancomycin           Septic embolism ([address] (8771)    Basilic vein thrombosis (2022)  · Continued lovenox  · Elevate RUE        Chest pain (2022)   pleuritic pain  · Continued dilaudid dose and  as needed oral oxycodone  · Wean O2 as tolerant, on 2 L NC          PETER (acute kidney injury) ([facility] (2022)  ·   Resolved  · Stopped  IVF 22           Hyponatremia (2022)  · Trend with BMP,  135        Anemia:  Thrombocytopenia:  · Trend CBC, improving      Elevated total bilirubin:  · Resolved       HTN:  · started norvasc 1-10-22  · Add metoprolol  · Add as needed IV hydralazine and vasotec      Folliculitis:  · Hibiclens bath      Constipation:  · Dulcolax,  colace and miralax        Dispo/Discharge Planning:    Pending     Diet:  ADULT DIET Regular  DVT PPx:lovenox  Code status: Full Code    Hospital Problems as of 1/11/2022 Never Reviewed          Codes Class Noted - Resolved POA    Bacteremia due to Gram-positive bacteria ICD-10-CM: R78.81  ICD-9-CM: 790.7  1/6/2022 - Present Unknown        Septic embolism ([facility] ICD-10-CM: Z74  ICD-9-CM: 415.12  1/6/2022 - Present Unknown        Cellulitis ICD-10-CM: L03.90  ICD-9-CM: 682.9  1/5/2022 - Present Unknown        * (Principal) Sepsis ([facility] ICD-10-CM: A41.9  ICD-9-CM: 038.9, 995.91  1/5/2022 - Present Unknown        Basilic vein thrombosis FVQ-00-AS: I82.619  ICD-9-CM: 453.81  1/5/2022 - Present Unknown        Cough ICD-10-CM: R05.9  ICD-9-CM: 786.2  1/5/2022 - Present Unknown        Chest pain ICD-10-CM: R07.9  ICD-9-CM: 786.50  1/5/2022 - Present Unknown        PETER (acute kidney injury) ([facility] ICD-10-CM: N17.9  ICD-9-CM: 584.9  1/5/2022 - Present Unknown        Hyponatremia ICD-10-CM: E87.1  ICD-9-CM: 276.1  1/5/2022 - Present Unknown              Objective:     Patient Vitals for the past 24 hrs:   Temp Pulse Resp BP SpO2   01/11/22 1521 98.3 °F (36.8 °C) 81  (!) 210/82 97 %   01/11/22 1300    (!) 189/82    01/11/22 1157 99.2 °F (37.3 °C) 78  (!) 215/83 99 %   01/11/22 0753 98.4 °F (36.9 °C) 74 18 (!) 177/80 98 %   01/11/22 0409 97.9 °F (36.6 °C) 72 18 (!) 165/88 99 %   01/10/22 2349 100.2 °F (37.9 °C) 97 18 (!) 160/79 97 %   01/10/22 2029 98.7 °F (37.1 °C) 81 18 (!) 166/80 100 %   01/10/22 1812 99 °F (37.2 °C)       01/10/22 1551 (!) 100.5 °F (38.1 °C) 93 18 (!) 146/86 98 %     Oxygen Therapy  O2 Sat (%): 97 % (01/11/22 1521)  Pulse via Oximetry: 86 beats per minute (01/06/22 0000)  O2 Device: None (Room air) (01/11/22 1521)  O2 Flow Rate (L/min): 2 l/min (01/10/22 0852)    Estimated body mass index is 29.69 kg/m² as calculated from the following:    Height as of this encounter: 6' (1.829 m). Weight as of this encounter: 99.3 kg (218 lb 14.7 oz). Intake/Output Summary (Last 24 hours) at 1/11/2022 1533  Last data filed at 1/11/2022 0753  Gross per 24 hour   Intake 240 ml   Output 1350 ml   Net -1110 ml         Physical Exam:     Blood pressure (!) 210/82, pulse 81, temperature 98.3 °F (36.8 °C), resp. rate 18, height 6' (1.829 m), weight 99.3 kg (218 lb 14.7 oz), SpO2 97 %. General:    Well nourished. No overt distress, alert , flat affect   CV:   RRR. No m/r/g. No jugular venous distension. No edema  LE   Lungs:   CTAB. No wheezing, rhonchi, or rales. Respirations even, unlabored anterior   Abdomen: Bowel sounds present. Soft, nontender, nondistended. Extremities: Edematous RUE  Neuro:  grossly intact. I have reviewed ordered lab tests and independently visualized imaging below:    Recent Labs:  Recent Results (from the past 48 hour(s))   CBC WITH AUTOMATED DIFF    Collection Time: 01/10/22  5:19 AM   Result Value Ref Range    WBC 5.2 4.3 - 11.1 K/uL    RBC 4.07 (L) 4.23 - 5.6 M/uL    HGB 11.6 (L) 13.6 - 17.2 g/dL    HCT 34.7 (L) 41.1 - 50.3 %    MCV 85.3 79.6 - 97.8 FL    MCH 28.5 26.1 - 32.9 PG    MCHC 33.4 31.4 - 35.0 g/dL    RDW 13.6 11.9 - 14.6 %    PLATELET 699 675 - 106 K/uL    MPV 10.2 9.4 - 12.3 FL    ABSOLUTE NRBC 0.00 0.0 - 0.2 K/uL    DF AUTOMATED      NEUTROPHILS 83 (H) 43 - 78 %    LYMPHOCYTES 8 (L) 13 - 44 %    MONOCYTES 7 4.0 - 12.0 %    EOSINOPHILS 1 0.5 - 7.8 %    BASOPHILS 1 0.0 - 2.0 %    IMMATURE GRANULOCYTES 1 0.0 - 5.0 %    ABS. NEUTROPHILS 4.3 1.7 - 8.2 K/UL    ABS. LYMPHOCYTES 0.4 (L) 0.5 - 4.6 K/UL    ABS. MONOCYTES 0.4 0.1 - 1.3 K/UL    ABS. EOSINOPHILS 0.0 0.0 - 0.8 K/UL    ABS. BASOPHILS 0.0 0.0 - 0.2 K/UL    ABS. IMM.  GRANS. 0.1 0.0 - 0.5 K/UL   METABOLIC PANEL, BASIC    Collection Time: 01/10/22  5:19 AM   Result Value Ref Range    Sodium 130 (L) 138 - 145 mmol/L    Potassium 3.8 3.5 - 5.1 mmol/L    Chloride 97 (L) 98 - 107 mmol/L    CO2 27 21 - 32 mmol/L    Anion gap 6 (L) 7 - 16 mmol/L    Glucose 105 (H) 65 - 100 mg/dL    BUN 19 6 - 23 MG/DL    Creatinine 1.04 0.8 - 1.5 MG/DL    GFR est AA >60 >60 ml/min/1.73m2    GFR est non-AA >60 >60 ml/min/1.73m2    Calcium 8.3 8.3 - 10.4 MG/DL   CBC WITH AUTOMATED DIFF    Collection Time: 01/11/22  5:19 AM   Result Value Ref Range    WBC 3.6 (L) 4.3 - 11.1 K/uL    RBC 4.07 (L) 4.23 - 5.6 M/uL    HGB 11.5 (L) 13.6 - 17.2 g/dL    HCT 34.4 (L) 41.1 - 50.3 %    MCV 84.5 79.6 - 97.8 FL    MCH 28.3 26.1 - 32.9 PG    MCHC 33.4 31.4 - 35.0 g/dL    RDW 13.5 11.9 - 14.6 %    PLATELET 374 249 - 738 K/uL    MPV 10.2 9.4 - 12.3 FL    ABSOLUTE NRBC 0.00 0.0 - 0.2 K/uL    DF AUTOMATED      NEUTROPHILS 74 43 - 78 %    LYMPHOCYTES 12 (L) 13 - 44 %    MONOCYTES 9 4.0 - 12.0 %    EOSINOPHILS 1 0.5 - 7.8 %    BASOPHILS 1 0.0 - 2.0 %    IMMATURE GRANULOCYTES 3 0.0 - 5.0 %    ABS. NEUTROPHILS 2.7 1.7 - 8.2 K/UL    ABS. LYMPHOCYTES 0.4 (L) 0.5 - 4.6 K/UL    ABS. MONOCYTES 0.3 0.1 - 1.3 K/UL    ABS. EOSINOPHILS 0.0 0.0 - 0.8 K/UL    ABS. BASOPHILS 0.0 0.0 - 0.2 K/UL    ABS. IMM.  GRANS. 0.1 0.0 - 0.5 K/UL   METABOLIC PANEL, BASIC    Collection Time: 01/11/22  5:19 AM   Result Value Ref Range    Sodium 135 (L) 136 - 145 mmol/L    Potassium 3.6 3.5 - 5.1 mmol/L    Chloride 98 98 - 107 mmol/L    CO2 29 21 - 32 mmol/L    Anion gap 8 7 - 16 mmol/L    Glucose 110 (H) 65 - 100 mg/dL    BUN 16 6 - 23 MG/DL    Creatinine 0.98 0.8 - 1.5 MG/DL    GFR est AA >60 >60 ml/min/1.73m2    GFR est non-AA >60 >60 ml/min/1.73m2    Calcium 8.4 8.3 - 10.4 MG/DL   VANCOMYCIN, RANDOM    Collection Time: 01/11/22  5:19 AM   Result Value Ref Range    Vancomycin, random 14.5 UG/ML       All Micro Results     Procedure Component Value Units Date/Time    CULTURE, BLOOD [[id number]] Collected: 01/08/22 0424    Order Status: Completed Specimen: Blood Updated: 01/11/22 0846     Special Requests: --        LEFT  Antecubital       Culture result: NO GROWTH 3 DAYS       CULTURE, BLOOD [[id number]] Collected: 01/08/22 0424    Order Status: Completed Specimen: Blood Updated: 01/11/22 0846     Special Requests: --        LEFT  HAND       Culture result: NO GROWTH 3 DAYS       BLOOD CULTURE [[id number]]  (Abnormal) Collected: 01/05/22 1228    Order Status: Completed Specimen: Blood Updated: 01/08/22 0749     Special Requests: --        LEFT  HAND       GRAM STAIN GRAM POSITIVE COCCI               AEROBIC AND ANAEROBIC BOTTLES                  CRITICAL RESULT NOT CALLED DUE TO PREVIOUS NOTIFICATION OF CRITICAL RESULT WITHIN THE LAST 24 HOURS. Culture result: STAPHYLOCOCCUS AUREUS               For Susceptibility Refer to Culture  Accession [id number]      BLOOD CULTURE [[id number]]  (Abnormal)  (Susceptibility) Collected: 01/05/22 1043    Order Status: Completed Specimen: Blood Updated: 01/08/22 0748     Special Requests: --        RIGHT  ARM       GRAM STAIN GRAM POSITIVE COCCI               AEROBIC AND ANAEROBIC BOTTLES                  RESULTS VERIFIED, PHONED TO AND READ BACK BY BERTO JOSHI @ 0130 1/6/22 MM           Culture result:       * METHICILLIN RESISTANT STAPHYLOCOCCUS AUREUS *                  Refer to Blood Culture ID Panel Accession  [id number]              RESULTS VERIFIED, PHONED TO AND READ BACK BY  Maria E Morgan RN ON 1/8/22 @0746,       COVID-19 RAPID TEST [[id number]] Collected: 01/06/22 0859    Order Status: Completed Specimen: Nasopharyngeal Updated: 01/06/22 0932     Specimen source NASAL        COVID-19 rapid test Not detected        Comment:      The specimen is NEGATIVE for SARS-CoV-2, the novel coronavirus associated with COVID-19. A negative result does not rule out COVID-19. This test has been authorized by the FDA under an Emergency Use Authorization (EUA) for use by authorized laboratories.         Fact sheet for Healthcare Providers: [employer].co.nz  Fact sheet for Patients: [facility]       Methodology: Isothermal Nucleic Acid Amplification         BLOOD CULTURE ID PANEL [[id number]]  (Abnormal) Collected: 01/05/22 1043    Order Status: Completed Specimen: Blood Updated: 01/06/22 0713     Acc. no. from Micro Order [id number]     Staphylococcus Detected        Comment: RESULTS VERIFIED, PHONED TO AND READ BACK BY  BERTO JOSHI @ 0130 1/6/22 MM          Staphylococcus aureus Detected        Comment: RESULTS VERIFIED, PHONED TO AND READ BACK BY  BERTO JOSHI @ 0130 1/6/22 MM          mecA (Methicillin-Resistance Genes) Detected        Comment: Presence of mecA is highly indicative of methicillin resistance. The test does not replace traditional culture and susceptibilities        INTERPRETATION       Gram positive cocci in clusters, identified by realtime PCR as SUSPECTED MRSA. Comment: Recommend IV vancomycin use, unlikely to be a contaminant. Infectious Diseases Consult recommended in adult patients. THIS TEST DOES NOT REPLACE SENSITIVITY TESTING. Other Studies:  No results found.     Current Meds:  Current Facility-Administered Medications   Medication Dose Route Frequency    sodium chloride (NS) flush 10 mL  10 mL InterCATHeter Q8H    sodium chloride (NS) flush 10 mL  10 mL InterCATHeter PRN    hydrALAZINE (APRESOLINE) 20 mg/mL injection 10 mg  10 mg IntraVENous Q6H PRN    [START ON 1/12/2022] amLODIPine (NORVASC) tablet 10 mg  10 mg Oral DAILY    enalaprilat (VASOTEC) injection 0.625 mg  0.625 mg IntraVENous Q6H PRN    metoprolol tartrate (LOPRESSOR) tablet 12.5 mg  12.5 mg Oral Q12H    bisacodyL (DULCOLAX) suppository 10 mg  10 mg Rectal DAILY PRN    alcohol 62% (NOZIN) nasal  1 Ampule  1 Ampule Topical Q12H    vancomycin (VANCOCIN) 1250 mg in  ml infusion  1,250 mg IntraVENous Q12H    polyethylene glycol (MIRALAX) packet 17 g  17 g Oral DAILY    docusate sodium (COLACE) capsule 100 mg  100 mg Oral BID    HYDROmorphone (DILAUDID) injection 1 mg  1 mg IntraVENous Q4H PRN    oxyCODONE IR (ROXICODONE) tablet 10 mg  10 mg Oral Q4H PRN    influenza vaccine 2021-22 (6 mos+)(PF) (FLUARIX/FLULAVAL/FLUZONE QUAD) injection 0.5 mL  1 Each IntraMUSCular PRIOR TO DISCHARGE    pantothenic ac-min oil-pet,hyd (AQUAPHOR) 41 % ointment   Topical DAILY    melatonin tablet 5 mg  5 mg Oral QHS    diphenhydrAMINE (BENADRYL) capsule 25 mg  25 mg Oral QHS PRN    sodium chloride (NS) flush 5-10 mL  5-10 mL IntraVENous PRN    sodium chloride (NS) flush 5-40 mL  5-40 mL IntraVENous Q8H    sodium chloride (NS) flush 5-40 mL  5-40 mL IntraVENous PRN    acetaminophen (TYLENOL) tablet 650 mg  650 mg Oral Q6H PRN    Or    acetaminophen (TYLENOL) suppository 650 mg  650 mg Rectal Q6H PRN    polyethylene glycol (MIRALAX) packet 17 g  17 g Oral DAILY PRN    ondansetron (ZOFRAN ODT) tablet 4 mg  4 mg Oral Q8H PRN    Or    ondansetron (ZOFRAN) injection 4 mg  4 mg IntraVENous Q6H PRN    enoxaparin (LOVENOX) injection 40 mg  40 mg SubCUTAneous DAILY    benzonatate (TESSALON) capsule 100 mg  100 mg Oral TID PRN    guaiFENesin-dextromethorphan (ROBITUSSIN DM) 100-10 mg/5 mL syrup 10 mL  10 mL Oral Q4H PRN       Signed:  Sis eHrnandez MD    Part of this note may have been written by using a voice dictation software. The note has been proof read but may still contain some grammatical/other typographical errors.

## 2022-01-11 NOTE — WOUND CARE
Patient seen for follow up on right arm. Noted swelling to arm improved. Blisters also drying up. No longer need silicone foam dressing but can use if desired. Please use Aquaphor ointment on arm to help with dry swollen skin. Answered all questions. Will sign off, please call if needed.

## 2022-01-11 NOTE — PROGRESS NOTES
PICC Placement Note    PRE-PROCEDURE VERIFICATION  Correct Procedure: yes. Time out completed with assistant Jennifer Metz all persons present in agreement with time out. Correct Site:  yes  Temperature: Temp: 98.4 °F (36.9 °C), Temperature Source: Temp Source: Oral  Recent Labs     01/11/22  0519   BUN 16   CREA 0.98      WBC 3.6*     Allergies: Patient has no known allergies. Education materials for [facility] given to patient or family. PROCEDURE DETAIL  A single lumen PICC line was started for antibiotic therapy. The following documentation is in addition to the PICC properties in the lines/airways flowsheet :  Lot #: [id number]  xylocaine used: yes  Mid-Arm Circumference: 29 (cm)  Internal Catheter Length: 52 (cm)  Internal Catheter Total Length: 52 (cm)  Vein Selection for PICC:left basilic  Central Line Bundle followed yes  Complication Related to Insertion: none  Both the insertion guidewire and ECG guidewire were removed intact all ports have positive blood return and were flush well with normal saline. The location of the tip of the PICC is verified using ECG technology. The tip is in the SVC per ECG reading. See image below.      Line is okay to use: yes

## 2022-01-12 ENCOUNTER — APPOINTMENT (OUTPATIENT)
Dept: ULTRASOUND IMAGING | Age: 57
DRG: 872 | End: 2022-01-12
Attending: INTERNAL MEDICINE

## 2022-01-12 VITALS
SYSTOLIC BLOOD PRESSURE: 161 MMHG | BODY MASS INDEX: 28.61 KG/M2 | OXYGEN SATURATION: 97 % | RESPIRATION RATE: 20 BRPM | HEIGHT: 72 IN | WEIGHT: 211.2 LBS | HEART RATE: 71 BPM | DIASTOLIC BLOOD PRESSURE: 85 MMHG | TEMPERATURE: 98.4 F

## 2022-01-12 LAB
ANION GAP SERPL CALC-SCNC: 6 MMOL/L (ref 7–16)
BASOPHILS # BLD: 0 K/UL (ref 0–0.2)
BASOPHILS NFR BLD: 1 % (ref 0–2)
BUN SERPL-MCNC: 16 MG/DL (ref 6–23)
CALCIUM SERPL-MCNC: 8.4 MG/DL (ref 8.3–10.4)
CHLORIDE SERPL-SCNC: 101 MMOL/L (ref 98–107)
CO2 SERPL-SCNC: 28 MMOL/L (ref 21–32)
CREAT SERPL-MCNC: 0.85 MG/DL (ref 0.8–1.5)
DIFFERENTIAL METHOD BLD: ABNORMAL
EOSINOPHIL # BLD: 0.1 K/UL (ref 0–0.8)
EOSINOPHIL NFR BLD: 1 % (ref 0.5–7.8)
ERYTHROCYTE [DISTWIDTH] IN BLOOD BY AUTOMATED COUNT: 13.4 % (ref 11.9–14.6)
GLUCOSE SERPL-MCNC: 107 MG/DL (ref 65–100)
HCT VFR BLD AUTO: 34.7 % (ref 41.1–50.3)
HGB BLD-MCNC: 11.4 G/DL (ref 13.6–17.2)
IMM GRANULOCYTES # BLD AUTO: 0.1 K/UL (ref 0–0.5)
IMM GRANULOCYTES NFR BLD AUTO: 1 % (ref 0–5)
LYMPHOCYTES # BLD: 0.7 K/UL (ref 0.5–4.6)
LYMPHOCYTES NFR BLD: 16 % (ref 13–44)
MAGNESIUM SERPL-MCNC: 3.6 MG/DL (ref 1.8–2.4)
MCH RBC QN AUTO: 27.6 PG (ref 26.1–32.9)
MCHC RBC AUTO-ENTMCNC: 32.9 G/DL (ref 31.4–35)
MCV RBC AUTO: 84 FL (ref 79.6–97.8)
MONOCYTES # BLD: 0.5 K/UL (ref 0.1–1.3)
MONOCYTES NFR BLD: 11 % (ref 4–12)
NEUTS SEG # BLD: 3.1 K/UL (ref 1.7–8.2)
NEUTS SEG NFR BLD: 70 % (ref 43–78)
NRBC # BLD: 0 K/UL (ref 0–0.2)
PLATELET # BLD AUTO: 201 K/UL (ref 150–450)
PMV BLD AUTO: 10.2 FL (ref 9.4–12.3)
POTASSIUM SERPL-SCNC: 3.2 MMOL/L (ref 3.5–5.1)
RBC # BLD AUTO: 4.13 M/UL (ref 4.23–5.6)
SODIUM SERPL-SCNC: 135 MMOL/L (ref 138–145)
WBC # BLD AUTO: 4.4 K/UL (ref 4.3–11.1)

## 2022-01-12 PROCEDURE — 80048 BASIC METABOLIC PNL TOTAL CA: CPT

## 2022-01-12 PROCEDURE — 93971 EXTREMITY STUDY: CPT

## 2022-01-12 PROCEDURE — 85025 COMPLETE CBC W/AUTO DIFF WBC: CPT

## 2022-01-12 PROCEDURE — 83735 ASSAY OF MAGNESIUM: CPT

## 2022-01-12 PROCEDURE — 74011250636 HC RX REV CODE- 250/636: Performed by: STUDENT IN AN ORGANIZED HEALTH CARE EDUCATION/TRAINING PROGRAM

## 2022-01-12 PROCEDURE — 36415 COLL VENOUS BLD VENIPUNCTURE: CPT

## 2022-01-12 PROCEDURE — 36592 COLLECT BLOOD FROM PICC: CPT

## 2022-01-12 PROCEDURE — 74011250636 HC RX REV CODE- 250/636: Performed by: INTERNAL MEDICINE

## 2022-01-12 PROCEDURE — 74011250637 HC RX REV CODE- 250/637: Performed by: INTERNAL MEDICINE

## 2022-01-12 PROCEDURE — 97116 GAIT TRAINING THERAPY: CPT

## 2022-01-12 PROCEDURE — 74011000250 HC RX REV CODE- 250: Performed by: INTERNAL MEDICINE

## 2022-01-12 RX ORDER — NYSTATIN 100000 [USP'U]/G
POWDER TOPICAL 2 TIMES DAILY
Status: DISCONTINUED | OUTPATIENT
Start: 2022-01-12 | End: 2022-01-12 | Stop reason: HOSPADM

## 2022-01-12 RX ORDER — AMLODIPINE BESYLATE 10 MG/1
10 TABLET ORAL DAILY
Qty: 90 TABLET | Refills: 0 | Status: SHIPPED | OUTPATIENT
Start: 2022-01-13

## 2022-01-12 RX ORDER — METOPROLOL TARTRATE 25 MG/1
25 TABLET, FILM COATED ORAL EVERY 12 HOURS
Qty: 60 TABLET | Refills: 0 | Status: SHIPPED | OUTPATIENT
Start: 2022-01-12

## 2022-01-12 RX ORDER — POTASSIUM CHLORIDE 20 MEQ/1
40 TABLET, EXTENDED RELEASE ORAL
Status: COMPLETED | OUTPATIENT
Start: 2022-01-12 | End: 2022-01-12

## 2022-01-12 RX ORDER — METOPROLOL TARTRATE 25 MG/1
25 TABLET, FILM COATED ORAL EVERY 12 HOURS
Status: DISCONTINUED | OUTPATIENT
Start: 2022-01-12 | End: 2022-01-12 | Stop reason: HOSPADM

## 2022-01-12 RX ORDER — VANCOMYCIN HYDROCHLORIDE
1250 EVERY 12 HOURS
Qty: 500 ML | Refills: 0 | Status: SHIPPED
Start: 2022-01-12

## 2022-01-12 RX ADMIN — SODIUM CHLORIDE, PRESERVATIVE FREE 10 ML: 5 INJECTION INTRAVENOUS at 05:46

## 2022-01-12 RX ADMIN — SODIUM CHLORIDE, PRESERVATIVE FREE 10 ML: 5 INJECTION INTRAVENOUS at 15:42

## 2022-01-12 RX ADMIN — AMLODIPINE BESYLATE 10 MG: 10 TABLET ORAL at 08:32

## 2022-01-12 RX ADMIN — ENOXAPARIN SODIUM 40 MG: 100 INJECTION SUBCUTANEOUS at 08:30

## 2022-01-12 RX ADMIN — POTASSIUM CHLORIDE 40 MEQ: 20 TABLET, EXTENDED RELEASE ORAL at 12:14

## 2022-01-12 RX ADMIN — VANCOMYCIN HYDROCHLORIDE 1250 MG: 10 INJECTION, POWDER, LYOPHILIZED, FOR SOLUTION INTRAVENOUS at 08:35

## 2022-01-12 RX ADMIN — Medication: at 08:36

## 2022-01-12 RX ADMIN — METOPROLOL TARTRATE 12.5 MG: 25 TABLET, FILM COATED ORAL at 08:31

## 2022-01-12 RX ADMIN — Medication 1 AMPULE: at 08:31

## 2022-01-12 NOTE — PROGRESS NOTES
Infectious Disease Progress Note    Today's Date: 2022   Admit Date: 2022    Impression:   · MRSA and CoNS bacteremia () secondary to skin and soft tissue infection; TTE negative but has evidence of septic lung emboli, repeat BC  NTD pending  · RUE cellulitis  · Right basilic vein thrombosis  · Septic pulmonary emboli  · Folliculitis, back     Plan:     · Continue Vancomycin dose has been adjusted for low VT, planning 6 weeks from negative BC; EOT 22  · [place] placed, and OPAT orders sent. · Hibiclens bath for folliculitis   · Dispo: patient to discharge home to continue IV Vancomycin  · ID follow up 22 @ 9:30am  · ID will sign off, please call with questions or concerns      Anti-infectives:   · Clinda   · Zosyn -  · Vanc -    Subjective: Interval History:   Feeling well, eager to DC. CM confirms patient ready for teaching and DC today. No Known Allergies     Review of Systems:  A comprehensive review of systems was negative except for that written in the History of Present Illness. Objective:     Visit Vitals  BP (!) 182/107 (BP 1 Location: Left lower arm, BP Patient Position: Sitting)   Pulse 80   Temp 98.7 °F (37.1 °C)   Resp 20   Ht 6' (1.829 m)   Wt 95.8 kg (211 lb 3.2 oz)   SpO2 96%   BMI 28.64 kg/m²     Temp (24hrs), Av.8 °F (37.1 °C), Min:98.3 °F (36.8 °C), Max:99.2 °F (37.3 °C)     Patient examined 2022, exam remains unchanged except as noted below:    General:  Alert, cooperative, no acute distress   Head:  Normocephalic, atraumatic    Eyes:  Anicteric, no drainage/not injected   Throat: Mucus membranes moist OP clear, dentition poor   Neck: Supple, symmetrical, trachea midline, no JVD   Lungs:   Clear throughout lung fields, no increased work of breathing   Heart:  Regular rate and rhythm, without murmur   Abdomen:   Soft, non-tender, no guarding, no distention, bowel sounds active   Extremities: R arm, edema appears to be improved.  No cellulitis, elbow dried wound   Skin: Back with raised lesions     Lines/Devices: Left arm PICC           Data Review:     CBC:  Recent Labs     01/12/22  0735 01/11/22  0519 01/10/22  0519 01/10/22  0519   WBC 4.4 3.6*  --  5.2   GRANS 70 74   < > 83*   MONOS 11 9   < > 7   EOS 1 1   < > 1   ANEU 3.1 2.7   < > 4.3   ABL 0.7 0.4*   < > 0.4*   HGB 11.4* 11.5*  --  11.6*   HCT 34.7* 34.4*  --  34.7*    162  --  188    < > = values in this interval not displayed. BMP:  Recent Labs     01/12/22  0735 01/11/22  0519 01/10/22  0519   CREA 0.85 0.98 1.04   BUN 16 16 19   * 135* 130*   K 3.2* 3.6 3.8    98 97*   CO2 28 29 27   AGAP 6* 8 6*   * 110* 105*       LFTS:  No results for input(s): TBILI, ALT, AP, TP, ALB in the last 72 hours. No lab exists for component: SGOT    Microbiology:     All Micro Results     Procedure Component Value Units Date/Time    CULTURE, BLOOD [[id number]] Collected: 01/08/22 0424    Order Status: Completed Specimen: Blood Updated: 01/11/22 0846     Special Requests: --        LEFT  Antecubital       Culture result: NO GROWTH 3 DAYS       CULTURE, BLOOD [[id number]] Collected: 01/08/22 0424    Order Status: Completed Specimen: Blood Updated: 01/11/22 0846     Special Requests: --        LEFT  HAND       Culture result: NO GROWTH 3 DAYS       BLOOD CULTURE [[id number]]  (Abnormal) Collected: 01/05/22 1228    Order Status: Completed Specimen: Blood Updated: 01/08/22 0749     Special Requests: --        LEFT  HAND       GRAM STAIN GRAM POSITIVE COCCI               AEROBIC AND ANAEROBIC BOTTLES                  CRITICAL RESULT NOT CALLED DUE TO PREVIOUS NOTIFICATION OF CRITICAL RESULT WITHIN THE LAST 24 HOURS.            Culture result: STAPHYLOCOCCUS AUREUS               For Susceptibility Refer to Culture  Accession [id number]      BLOOD CULTURE [[id number]]  (Abnormal)  (Susceptibility) Collected: 01/05/22 1043    Order Status: Completed Specimen: Blood Updated: 01/08/22 6744     Special Requests: --        RIGHT  ARM       GRAM STAIN GRAM POSITIVE COCCI               AEROBIC AND ANAEROBIC BOTTLES                  RESULTS VERIFIED, PHONED TO AND READ BACK BY BERTO JOSHI @ 0130 1/6/22 MM           Culture result:       * METHICILLIN RESISTANT STAPHYLOCOCCUS AUREUS *                  Refer to Blood Culture ID Panel Accession  [id number]              RESULTS VERIFIED, PHONED TO AND READ BACK BY  Nino Valdez RN ON 1/8/22 @0746, TA      COVID-19 RAPID TEST [[id number]] Collected: 01/06/22 0859    Order Status: Completed Specimen: Nasopharyngeal Updated: 01/06/22 0932     Specimen source NASAL        COVID-19 rapid test Not detected        Comment:      The specimen is NEGATIVE for SARS-CoV-2, the novel coronavirus associated with COVID-19. A negative result does not rule out COVID-19. This test has been authorized by the FDA under an Emergency Use Authorization (EUA) for use by authorized laboratories. Fact sheet for Healthcare Providers: "[employer].co.nz  Fact sheet for Patients: "[employer].co.nz       Methodology: Isothermal Nucleic Acid Amplification         BLOOD CULTURE ID PANEL [[id number]]  (Abnormal) Collected: 01/05/22 1043    Order Status: Completed Specimen: Blood Updated: 01/06/22 0713     Acc. no. from Micro Order [id number]     Staphylococcus Detected        Comment: RESULTS VERIFIED, PHONED TO AND READ BACK BY  BERTO JOSHI @ 0130 1/6/22 MM          Staphylococcus aureus Detected        Comment: RESULTS VERIFIED, PHONED TO AND READ BACK BY  BERTO JOSHI @ 0130 1/6/22 MM          mecA (Methicillin-Resistance Genes) Detected        Comment: Presence of mecA is highly indicative of methicillin resistance. The test does not replace traditional culture and susceptibilities        INTERPRETATION       Gram positive cocci in clusters, identified by realtime PCR as SUSPECTED MRSA.            Comment: Recommend IV vancomycin use, unlikely to be a contaminant. Infectious Diseases Consult recommended in adult patients. THIS TEST DOES NOT REPLACE SENSITIVITY TESTING.              Imaging:   Reviewed    Signed By: Lisa Segura NP     January 12, 2022

## 2022-01-12 NOTE — PROGRESS NOTES
Call from monitor room. Pt had run of Vtach. Checked on Pt. Pt Rhythm back to NSR. /84 and complaining of heartburn. Dr. Bonita Wilkerson notified via [employer].  No new orders

## 2022-01-12 NOTE — PROGRESS NOTES
Spouse complete IV training with Derrel Dakin, this am. Spouse completed financial assistance paperwork and submitted to office. [facility] liaison contacted CM to confirm application submitted. Per Hospitalist pt will d/c today after dublex as BP improved. Pt to discharge home today. HH needs include PT/RN and referral placed to [facility] as requested and liaison aware. [employer] is providing IV ABT needs and pt is on 8 month payment plan with [employer]. No DME needs noted. Wife to transport pt home. GoodRx coupon given for BP medications and pt aware to go to [facility] as one medication free and another $7. All needs met. CM available if any new needs arise. Care Management Interventions  PCP Verified by CM: No (WIll provide information for [facility] )  Mode of Transport at Discharge:  Other (see comment) (family )  Transition of Care Consult (CM Consult): Discharge [address]: Yes  Discharge Durable Medical Equipment: No  Physical Therapy Consult: Yes  Occupational Therapy Consult: Yes  Speech Therapy Consult: No  Support Systems: Spouse/Significant Other,Other Family Member(s) (sibling )  Confirm Follow Up Transport: Family  The Plan for Transition of Care is Related to the Following Treatment Goals : Return to baseline  The Patient and/or Patient Representative was Provided with a Choice of Provider and Agrees with the Discharge Plan?: Yes  Name of the Patient Representative Who was Provided with a Choice of Provider and Agrees with the Discharge Plan: pt and spouse  Freedom of Choice List was Provided with Basic Dialogue that Supports the Patient's Individualized Plan of Care/Goals, Treatment Preferences and Shares the Quality Data Associated with the Providers?: Yes  [place] Resource Information Provided?: No  Discharge Location  Discharge Placement: Home with [facility] & [facility]

## 2022-01-12 NOTE — PROGRESS NOTES
ACUTE PHYSICAL THERAPY GOALS:  (Developed with and agreed upon by patient and/or caregiver.)  (1.) Alysia Mishra will transfer from bed to chair and chair to bed with INDEPENDENCE using the least restrictive device within 7 treatment day(s). (2.) Bertram Nettles will ambulate with INDEPENDENCE for 500 feet with the least restrictive device within 7 treatment day(s). (3.) Bertram Nettles will perform standing static and dynamic balance activities x 20 minutes with SUPERVISION to improve safety within 7 treatment day(s). (4.) Bertram Nettles will ascend and descend 3 stairs using 0 hand rail(s) with INDEPENDENT to improve functional mobility and safety within 7 treatment day(s). (5.) Bertram Nettles will perform bilateral lower extremity exercises x 20 min for HEP with INDEPENDENCE to improve strength, endurance, and functional mobility within 7 treatment day(s). PHYSICAL THERAPY: Daily Note and PM Treatment Day # 3    Alysia Mishra is a 64 y.o. male   PRIMARY DIAGNOSIS: Sepsis ([address]  Cellulitis [L03.90]         ASSESSMENT:     REHAB RECOMMENDATIONS: CURRENT LEVEL OF FUNCTION:  (Most Recently Demonstrated)   Recommendation to date pending progress:  [address] [place] [address] Therapy  Equipment:    None Bed Mobility:   Independent  Sit to Stand:   Supervision  Transfers:   Supervision  Gait/Mobility:   Supervision     ASSESSMENT:  Mr. Frances Astudillo was received supine in bed, agreeable to therapy. Pt is independent with bed mobility. He progressed to being able to ambulate 250 ft with supervision, no AD. Pt noted to have improved balance today- much more steady. Pt and spouse educated on safety with mobility at home as well as activity pacing. Pt and spouse denied any questions or concerns about this. Good progress, will continue to follow.       SUBJECTIVE:   Mr. Frances Astudillo states, Kassidy Dawkins you\"    SOCIAL HISTORY/ LIVING ENVIRONMENT: see eval  Support Systems: Spouse/Significant Other,Other Family Member(s) (sibling )  OBJECTIVE:     PAIN: VITAL SIGNS: LINES/DRAINS:   Pre Treatment: Pain Screen  Pain Scale 1: Numeric (0 - 10)  Pain Intensity 1: 0  Post Treatment: 0   PICC  O2 Device: None (Room air)     MOBILITY: I Mod I S SBA CGA Min Mod Max Total  NT x2 Comments:   Bed Mobility    Rolling [x] [] [] [] [] [] [] [] [] [] []    Supine to Sit [x] [] [] [] [] [] [] [] [] [] []    Scooting [x] [] [] [] [] [] [] [] [] [] []    Sit to Supine [x] [] [] [] [] [] [] [] [] [] []    Transfers    Sit to Stand [] [] [x] [] [] [] [] [] [] [] []    Bed to Chair [] [] [] [] [] [] [] [] [] [x] []    Stand to Sit [] [] [x] [] [] [] [] [] [] [] []    I=Independent, Mod I=Modified Independent, S=Supervision, SBA=Standby Assistance, CGA=Contact Guard Assistance,   Min=Minimal Assistance, Mod=Moderate Assistance, Max=Maximal Assistance, Total=Total Assistance, NT=Not Tested    BALANCE: Good Fair+ Fair Fair- Poor NT Comments   Sitting Static [x] [] [] [] [] []    Sitting Dynamic [x] [] [] [] [] []              Standing Static [x] [] [] [] [] []    Standing Dynamic [] [x] [] [] [] []      GAIT: I Mod I S SBA CGA Min Mod Max Total  NT x2 Comments:   Level of Assistance [] [] [x] [] [] [] [] [] [] [] []    Distance 250 ft    DME None    Gait Quality Unsteady with turns but much better today    Weightbearing  Status N/A     I=Independent, Mod I=Modified Independent, S=Supervision, SBA=Standby Assistance, CGA=Contact Guard Assistance,   Min=Minimal Assistance, Mod=Moderate Assistance, Max=Maximal Assistance, Total=Total Assistance, NT=Not Tested    PLAN:   FREQUENCY/DURATION: PT Plan of Care: 3 times/week for duration of hospital stay or until stated goals are met, whichever comes first.  TREATMENT:     TREATMENT:   ($$ Gait Trainin-22 mins    )  Gait Training (10 Minutes): Gait training for 250 feet utilizing None. Patient required Verbal cueing to improve Activity Pacing and Dynamic Standing Balance.      TREATMENT GRID:   Date:  22 Date: Date:     Activity/Exercise Parameters Parameters Parameters   LAQs 3 x 10     Seated Marching  3 x 10     STS 3 x 5                                   AFTER TREATMENT POSITION/PRECAUTIONS:  Bed, Needs within reach, RN notified and Visitors at bedside    INTERDISCIPLINARY COLLABORATION:  RN/PCT    TOTAL TREATMENT DURATION:  PT Patient Time In/Time Out  Time In: 1513  Time Out: [address], PT

## 2022-01-12 NOTE — DISCHARGE SUMMARY
Hospitalist Discharge Summary   Admit Date:  2022 11:17 AM   DC Note date: 2022  Name:  Leigh Queen   Age:  64 y.o. Sex:  male  :  1965   MRN:  [id number]   Room:  [place] [address]  PCP:  Unknown, Provider, DPM    Presenting Complaint: Blood infection    Initial Admission Diagnosis: Cellulitis [L03.90]     Problem List for this Hospitalization:  Hospital Problems as of 2022 Never Reviewed          Codes Class Noted - Resolved POA    Bacteremia due to Gram-positive bacteria ICD-10-CM: R78.81  ICD-9-CM: 790.7  2022 - Present Unknown        Septic embolism ([facility] ICD-10-CM: G25  ICD-9-CM: 415.12  2022 - Present Unknown        Cellulitis ICD-10-CM: L03.90  ICD-9-CM: 682.9  2022 - Present Unknown        * (Principal) Sepsis ([facility] ICD-10-CM: A41.9  ICD-9-CM: 038.9, 995.91  2022 - Present Unknown        Basilic vein thrombosis LHY-28-DF: I82.619  ICD-9-CM: 453.81  2022 - Present Unknown        Cough ICD-10-CM: R05.9  ICD-9-CM: 786.2  2022 - Present Unknown        Chest pain ICD-10-CM: R07.9  ICD-9-CM: 786.50  2022 - Present Unknown        PETER (acute kidney injury) ([facility] ICD-10-CM: N17.9  ICD-9-CM: 584.9  2022 - Present Unknown        Hyponatremia ICD-10-CM: E87.1  ICD-9-CM: 276.1  2022 - Present Unknown            Did Patient have Sepsis (YES OR NO): yes     Hospital Course:     Mr. Unique Lopez is a 65 yo male without PMH admitted with RUE cellulitis. He is found with MRSA bacteremia and RUE basilic thrombus. CTA chest shows septic thrombus/RML cavitation. He is on prophylactic lovenox. He is on IV vancomycin. TTE no IE. ID following. PICC placed pending for longterm antibiotics. EOT vancomycin 22. He had PETER that resolved with hydration. He will need followup CTA chest. Repeat RUE duplex shows clot stability. O2 weaned to room air. He has been hypertensive and improved on new dose of norvasc and metoprolol.      discharge plans are  to home.        Disposition: Diet: ADULT DIET Regular  Code Status: Full Code    Follow Up Orders: Follow-up Appointments   Procedures    FOLLOW UP VISIT Appointment in: One Week 1-2 weeks primary care MD, needs referral to [facility] for CT chest followup, needs ID followup as scheduled     1-2 weeks primary care MD, needs referral to [facility] for CT chest followup, needs ID followup as scheduled     Standing Status:   Standing     Number of Occurrences:   1     Order Specific Question:   Appointment in     Answer: One Week       Follow-up Information     Follow up With Specialties Details Why Contact Info    [facility]   2/18/22 @ 9:30am [phone], [id number] [facility]          Follow up labs/diagnostics (ultimately defer to outpatient provider):     ID     Time spent in patient discharge and coordination 35  minutes. Plan was discussed with patient and wife. All questions answered. Patient was stable at time of discharge. Instructions given to call a physician or return if any concerns. Discharge Info:   Current Discharge Medication List      START taking these medications    Details   amLODIPine (NORVASC) 10 mg tablet Take 1 Tablet by mouth daily. Qty: 90 Tablet, Refills: 0  Start date: 1/13/2022      metoprolol tartrate (LOPRESSOR) 25 mg tablet Take 1 Tablet by mouth every twelve (12) hours. Qty: 60 Tablet, Refills: 0  Start date: 1/12/2022      vancomycin/0.9 % sod chloride (vancomycin in 0.9% Sodium Cl) 1.25 gram/250 mL soln 250 mL by IntraVENous route every twelve (12) hours every twelve (12) hours.   Qty: 500 mL, Refills: 0  Start date: 1/12/2022         STOP taking these medications       ibuprofen (MOTRIN) 600 mg tablet Comments:   Reason for Stopping:         HYDROcodone-acetaminophen (NORCO) 5-325 mg per tablet Comments:   Reason for Stopping:               Procedures done this admission:  * No surgery found *    Consults this admission:  IP CONSULT TO INFECTIOUS DISEASES    Echocardiogram/EKG results:  Results from Hospital Encounter encounter on 01/05/22    ECHO ADULT COMPLETE    Interpretation Summary    Left Ventricle: Left ventricle size is normal. Mildly increased wall thickness. Normal wall motion. Normal left ventricular systolic function with a visually estimated EF of 60 - 65%. Normal diastolic function.   Left Atrium: Left atrium is mildly dilated.   Contrast used: Definity. EKG Results     Procedure 720 Value Units Date/Time    EKG, 12 LEAD, INITIAL [[id number]] Collected: 01/05/22 1123    Order Status: Completed Updated: 01/05/22 1519     Ventricular Rate 101 BPM      Atrial Rate 101 BPM      P-R Interval 155 ms      QRS Duration 91 ms      Q-T Interval 338 ms      QTC Calculation (Bezet) 439 ms      Calculated P Axis 26 degrees      Calculated R Axis 19 degrees      Calculated T Axis 142 degrees      Diagnosis --     Sinus tachycardia  Probable left atrial enlargement  LVH with secondary repolarization abnormality    Confirmed by ST BETH PABLO MD (), IMELDA MEDINA ([id number]) on 1/5/2022 3:19:05 PM            Diagnostic Imaging/Tests:   CT UP EXT RT W CONT    Result Date: 1/5/2022  Exam: CT of the right upper extremity with contrast INDICATION:  Concern for necrotizing fasciitis of the right upper extremity Comparison: None. Multiple axial images were obtained through the right upper extremity after intravenous injection of 100mL of Isovue 370. Radiation dose reduction techniques were used for this study: All CT scans performed at this facility use one or all of the following: Automated exposure control, adjustment of the mA and/or kVp according to patient's size, iterative reconstruction.  FINDINGS: There is subcutaneous edema centered along the dorsal aspect of the upper arm, beginning approximately 16 cm above the elbow joint and extending distally to the level of the wrist with involvement of both volar and dorsal subcutaneous soft tissues of the forearm. There is confluent edema but no discrete organized rim-enhancing fluid collection. No soft tissue gas. Evaluation for deep compartment involvement is limited by CT. There is a filling defect within the right brachial vein with more distal involvement difficult to exclude. No acute osseous abnormality. 1. Significant subcutaneous edema involving the right upper arm compatible with cellulitis. No evidence of soft tissue gas or abscess. Evaluation for deep compartment involvement is limited by CT. 2. Filling defect within at least the right brachial vein concerning for deep vein thrombosis. Recommend correlation with right right upper extremity ultrasound. XR CHEST PORT    Result Date: 1/5/2022  EXAM: XR CHEST PORT INDICATION: meets SIRS criteria COMPARISON: None FINDINGS: The cardiomediastinal silhouette is within normal limits. No focal parenchymal process. No pleural effusion. No pneumothorax. No acute osseous abnormality. No acute cardiopulmonary abnormality. DUPLEX UPPER EXT VENOUS RIGHT    Result Date: 1/5/2022  Right upper extremity venous ultrasound INDICATION:  Right arm swelling FINDINGS: Doppler ultrasound of the right upper extremity shows complete thrombosis of the right basilic vein. Subclavian and axillary veins are patent. Cephalic and forearm veins are also patent. Thrombosis of the right basilic vein     CT CHEST PULMONARY EMBOLISM    Result Date: 1/6/2022  CT CHEST WITH CONTRAST  1/6/2022 1:56 PM HISTORY:  bacteremia; chest pressure, shortness of breath; bacteremia; chest pressure, shortness of breath. DVT TECHNIQUE: The patient received 100 mL Isovue-370 nonionic IV contrast and axial images were obtained through the chest. Coronal reformatted images were generated. All CT scans at this facility used dose modulation, interactive reconstruction and/or weight based dosing when appropriate to reduce radiation dose to as low as reasonably achievable.  COMPARISON: Chest radiograph from the same day FINDINGS: There are no filling defects within the main or proximal segmental pulmonary artery suggestive of emboli. The thoracic aorta is well-opacified without dissection. There is no thoracic adenopathy. Small pleural effusions are present with lower lobe atelectasis. Nodular airspace opacities are present in the periphery of the lungs. There is what appears to be cavitation associated with a nodule in the periphery of the right upper lobe (image 63). These may be septic emboli or an atypical infection. Central airways are patent. Included portions of the upper abdomen demonstrate normal-appearing adrenal glands. Bone windows demonstrate no aggressive osseous lesions. 1. No pulmonary embolism. 2. Multifocal peripheral nodular consolidation with area of probable cavitation in the right middle lobe periphery. This may be caused by septic emboli. All Micro Results     Procedure Component Value Units Date/Time    CULTURE, BLOOD [[id number]] Collected: 01/08/22 0424    Order Status: Completed Specimen: Blood Updated: 01/12/22 1052     Special Requests: --        LEFT  HAND       Culture result: NO GROWTH 4 DAYS       CULTURE, BLOOD [[id number]] Collected: 01/08/22 0424    Order Status: Completed Specimen: Blood Updated: 01/12/22 1052     Special Requests: --        LEFT  Antecubital       Culture result: NO GROWTH 4 DAYS       BLOOD CULTURE [[id number]]  (Abnormal) Collected: 01/05/22 1228    Order Status: Completed Specimen: Blood Updated: 01/08/22 0749     Special Requests: --        LEFT  HAND       GRAM STAIN GRAM POSITIVE COCCI               AEROBIC AND ANAEROBIC BOTTLES                  CRITICAL RESULT NOT CALLED DUE TO PREVIOUS NOTIFICATION OF CRITICAL RESULT WITHIN THE LAST 24 HOURS.            Culture result: STAPHYLOCOCCUS AUREUS               For Susceptibility Refer to Culture  Accession [id number]      BLOOD CULTURE [[id number]]  (Abnormal)  (Susceptibility) Collected: 01/05/22 1043    Order Status: Completed Specimen: Blood Updated: 01/08/22 0748     Special Requests: --        RIGHT  ARM       GRAM STAIN GRAM POSITIVE COCCI               AEROBIC AND ANAEROBIC BOTTLES                  RESULTS VERIFIED, PHONED TO AND READ BACK BY BERTO JOSHI @ 0130 1/6/22 MM           Culture result:       * METHICILLIN RESISTANT STAPHYLOCOCCUS AUREUS *                  Refer to Blood Culture ID Panel Accession  [id number]              RESULTS VERIFIED, PHONED TO AND READ BACK BY  Juan Carlos Urbano RN ON 1/8/22 @0746, TA      COVID-19 RAPID TEST [[id number]] Collected: 01/06/22 0859    Order Status: Completed Specimen: Nasopharyngeal Updated: 01/06/22 0932     Specimen source NASAL        COVID-19 rapid test Not detected        Comment:      The specimen is NEGATIVE for SARS-CoV-2, the novel coronavirus associated with COVID-19. A negative result does not rule out COVID-19. This test has been authorized by the FDA under an Emergency Use Authorization (EUA) for use by authorized laboratories. Fact sheet for Healthcare Providers: ConventionUpdate.co.nz  Fact sheet for Patients: ConventionUpdate.co.nz       Methodology: Isothermal Nucleic Acid Amplification         BLOOD CULTURE ID PANEL [[id number]]  (Abnormal) Collected: 01/05/22 1043    Order Status: Completed Specimen: Blood Updated: 01/06/22 0713     Acc. no. from Micro Order [id number]     Staphylococcus Detected        Comment: RESULTS VERIFIED, PHONED TO AND READ BACK BY  BERTO JOSHI @ 0130 1/6/22 MM          Staphylococcus aureus Detected        Comment: RESULTS VERIFIED, PHONED TO AND READ BACK BY  BERTO JOSHI @ 0130 1/6/22 MM          mecA (Methicillin-Resistance Genes) Detected        Comment: Presence of mecA is highly indicative of methicillin resistance.    The test does not replace traditional culture and susceptibilities        INTERPRETATION       Gram positive cocci in clusters, identified by realtime PCR as SUSPECTED MRSA. Comment: Recommend IV vancomycin use, unlikely to be a contaminant. Infectious Diseases Consult recommended in adult patients. THIS TEST DOES NOT REPLACE SENSITIVITY TESTING. Labs: Results:       BMP, Mg, Phos Recent Labs     01/12/22  0735 01/11/22  0519 01/10/22  0519   * 135* 130*   K 3.2* 3.6 3.8    98 97*   CO2 28 29 27   AGAP 6* 8 6*   BUN 16 16 19   CREA 0.85 0.98 1.04   CA 8.4 8.4 8.3   * 110* 105*   MG 3.6*  --   --       CBC Recent Labs     01/12/22  0735 01/11/22  0519 01/10/22  0519   WBC 4.4 3.6* 5.2   RBC 4.13* 4.07* 4.07*   HGB 11.4* 11.5* 11.6*   HCT 34.7* 34.4* 34.7*    162 188   GRANS 70 74 83*   LYMPH 16 12* 8*   EOS 1 1 1   MONOS 11 9 7   BASOS 1 1 1   IG 1 3 1   ANEU 3.1 2.7 4.3   ABL 0.7 0.4* 0.4*   YEN 0.1 0.0 0.0   ABM 0.5 0.3 0.4   ABB 0.0 0.0 0.0   AIG 0.1 0.1 0.1      LFT No results for input(s): ALT, TBIL, AP, TP, ALB, GLOB, AGRAT in the last 72 hours.     No lab exists for component: SGOT, GPT   Cardiac Testing No results found for: BNPP, BNP, CPK, RCK1, RCK2, RCK3, RCK4, CKMB, CKNDX, CKND1, TROPT, TROIQ   Coagulation Tests No results found for: PTP, INR, APTT, INREXT   A1c Lab Results   Component Value Date/Time    Hemoglobin A1c 5.2 01/05/2022 10:43 AM      Lipid Panel Lab Results   Component Value Date/Time    Cholesterol, total 140 01/05/2022 10:43 AM    HDL Cholesterol 37 (L) 01/05/2022 10:43 AM    LDL, calculated 85.8 01/05/2022 10:43 AM    VLDL, calculated 17.2 01/05/2022 10:43 AM    Triglyceride 86 01/05/2022 10:43 AM    CHOL/HDL Ratio 3.8 01/05/2022 10:43 AM      Thyroid Panel No results found for: TSH, T4, FT4, TT3, T3U, TSHEXT     Most Recent UA Lab Results   Component Value Date/Time    WBC 3-5 06/13/2008 03:35 AM    RBC >100 06/13/2008 03:35 AM    Epithelial cells 0-3 06/13/2008 03:35 AM    Bacteria 2+ (H) 06/13/2008 03:35 AM    Casts 0 06/13/2008 03:35 AM    Crystals, urine 0 06/13/2008 03:35 AM    Mucus 0 06/13/2008 03:35 AM    Other observations OCCASIONAL YEAST 06/13/2008 03:35 AM          All Labs from Last 24 Hrs:  Recent Results (from the past 24 hour(s))   CBC WITH AUTOMATED DIFF    Collection Time: 01/12/22  7:35 AM   Result Value Ref Range    WBC 4.4 4.3 - 11.1 K/uL    RBC 4.13 (L) 4.23 - 5.6 M/uL    HGB 11.4 (L) 13.6 - 17.2 g/dL    HCT 34.7 (L) 41.1 - 50.3 %    MCV 84.0 79.6 - 97.8 FL    MCH 27.6 26.1 - 32.9 PG    MCHC 32.9 31.4 - 35.0 g/dL    RDW 13.4 11.9 - 14.6 %    PLATELET 895 097 - 882 K/uL    MPV 10.2 9.4 - 12.3 FL    ABSOLUTE NRBC 0.00 0.0 - 0.2 K/uL    DF AUTOMATED      NEUTROPHILS 70 43 - 78 %    LYMPHOCYTES 16 13 - 44 %    MONOCYTES 11 4.0 - 12.0 %    EOSINOPHILS 1 0.5 - 7.8 %    BASOPHILS 1 0.0 - 2.0 %    IMMATURE GRANULOCYTES 1 0.0 - 5.0 %    ABS. NEUTROPHILS 3.1 1.7 - 8.2 K/UL    ABS. LYMPHOCYTES 0.7 0.5 - 4.6 K/UL    ABS. MONOCYTES 0.5 0.1 - 1.3 K/UL    ABS. EOSINOPHILS 0.1 0.0 - 0.8 K/UL    ABS. BASOPHILS 0.0 0.0 - 0.2 K/UL    ABS. IMM.  GRANS. 0.1 0.0 - 0.5 K/UL   METABOLIC PANEL, BASIC    Collection Time: 01/12/22  7:35 AM   Result Value Ref Range    Sodium 135 (L) 138 - 145 mmol/L    Potassium 3.2 (L) 3.5 - 5.1 mmol/L    Chloride 101 98 - 107 mmol/L    CO2 28 21 - 32 mmol/L    Anion gap 6 (L) 7 - 16 mmol/L    Glucose 107 (H) 65 - 100 mg/dL    BUN 16 6 - 23 MG/DL    Creatinine 0.85 0.8 - 1.5 MG/DL    GFR est AA >60 >60 ml/min/1.73m2    GFR est non-AA >60 >60 ml/min/1.73m2    Calcium 8.4 8.3 - 10.4 MG/DL   MAGNESIUM    Collection Time: 01/12/22  7:35 AM   Result Value Ref Range    Magnesium 3.6 (H) 1.8 - 2.4 mg/dL       Current Med List in Hospital:   Current Facility-Administered Medications   Medication Dose Route Frequency    metoprolol tartrate (LOPRESSOR) tablet 25 mg  25 mg Oral Q12H    nystatin (MYCOSTATIN) 100,000 unit/gram powder   Topical BID    sodium chloride (NS) flush 10 mL  10 mL InterCATHeter Q8H    sodium chloride (NS) flush 10 mL  10 mL InterCATHeter PRN    hydrALAZINE (APRESOLINE) 20 mg/mL injection 10 mg  10 mg IntraVENous Q6H PRN    amLODIPine (NORVASC) tablet 10 mg  10 mg Oral DAILY    enalaprilat (VASOTEC) injection 0.625 mg  0.625 mg IntraVENous Q6H PRN    bisacodyL (DULCOLAX) suppository 10 mg  10 mg Rectal DAILY PRN    alcohol 62% (NOZIN) nasal  1 Ampule  1 Ampule Topical Q12H    vancomycin (VANCOCIN) 1250 mg in  ml infusion  1,250 mg IntraVENous Q12H    polyethylene glycol (MIRALAX) packet 17 g  17 g Oral DAILY    docusate sodium (COLACE) capsule 100 mg  100 mg Oral BID    HYDROmorphone (DILAUDID) injection 1 mg  1 mg IntraVENous Q4H PRN    oxyCODONE IR (ROXICODONE) tablet 10 mg  10 mg Oral Q4H PRN    influenza vaccine 2021-22 (6 mos+)(PF) (FLUARIX/FLULAVAL/FLUZONE QUAD) injection 0.5 mL  1 Each IntraMUSCular PRIOR TO DISCHARGE    pantothenic ac-min oil-pet,hyd (AQUAPHOR) 41 % ointment   Topical DAILY    melatonin tablet 5 mg  5 mg Oral QHS    diphenhydrAMINE (BENADRYL) capsule 25 mg  25 mg Oral QHS PRN    sodium chloride (NS) flush 5-10 mL  5-10 mL IntraVENous PRN    sodium chloride (NS) flush 5-40 mL  5-40 mL IntraVENous Q8H    sodium chloride (NS) flush 5-40 mL  5-40 mL IntraVENous PRN    acetaminophen (TYLENOL) tablet 650 mg  650 mg Oral Q6H PRN    Or    acetaminophen (TYLENOL) suppository 650 mg  650 mg Rectal Q6H PRN    polyethylene glycol (MIRALAX) packet 17 g  17 g Oral DAILY PRN    ondansetron (ZOFRAN ODT) tablet 4 mg  4 mg Oral Q8H PRN    Or    ondansetron (ZOFRAN) injection 4 mg  4 mg IntraVENous Q6H PRN    enoxaparin (LOVENOX) injection 40 mg  40 mg SubCUTAneous DAILY    benzonatate (TESSALON) capsule 100 mg  100 mg Oral TID PRN    guaiFENesin-dextromethorphan (ROBITUSSIN DM) 100-10 mg/5 mL syrup 10 mL  10 mL Oral Q4H PRN       No Known Allergies  There is no immunization history for the selected administration types on file for this patient.     Recent Vital Data:  Patient Vitals for the past 24 hrs:   Temp Pulse Resp BP SpO2   01/12/22 1125 98.4 °F (36.9 °C) 71 20 (!) 161/85 97 %   01/12/22 0751 98.7 °F (37.1 °C) 80 20 (!) 182/107 96 %   01/12/22 0305 98.7 °F (37.1 °C) 74 16 (!) 172/84 96 %   01/11/22 2351  74      01/11/22 2310 98.8 °F (37.1 °C) 75 18 (!) 158/80 94 %   01/11/22 2138    (!) 154/76    01/11/22 2137    (!) 195/73    01/11/22 1943 99 °F (37.2 °C) 80 18 (!) 220/77 96 %   01/11/22 1701  88  (!) 185/81    01/11/22 1521 98.3 °F (36.8 °C) 81 17 (!) 210/82 97 %     Oxygen Therapy  O2 Sat (%): 97 % (01/12/22 1125)  Pulse via Oximetry: 86 beats per minute (01/06/22 0000)  O2 Device: None (Room air) (01/11/22 1540)  O2 Flow Rate (L/min): 2 l/min (01/10/22 0852)    Estimated body mass index is 28.64 kg/m² as calculated from the following:    Height as of this encounter: 6' (1.829 m). Weight as of this encounter: 95.8 kg (211 lb 3.2 oz). Intake/Output Summary (Last 24 hours) at 1/12/2022 1412  Last data filed at 1/12/2022 1125  Gross per 24 hour   Intake 1000 ml   Output 2300 ml   Net -1300 ml         Physical Exam:  General:    Well nourished. No overt distress  CV:   RRR. No m/r/g. No JVD, no edema, 2+ right radial pulse  Lungs:   CTAB. No wheezing, rhonchi, or rales. Even, unlabored  Abdomen:   Soft, nontender, nondistended. Extremities: Decreased right UE edema     Skin:     Diffuse follicular rash of posterior trunk  Neuro:   grossly intact. Psych:  Normal mood and affect. Signed:  Shana Johnson MD    Part of this note may have been written by using a voice dictation software. The note has been proof read but may still contain some grammatical/other typographical errors.

## 2022-01-12 NOTE — PROGRESS NOTES
Pt /77 and BP checked leg as as RUE with clot and LUE with PICC. PRN and scheduled BP medication given. Dr. Nilsa Waggoner aware and okayed BP to be checked in Left lower arm below PICC only when nothing running through PICC. Follow up BP in leg 195/73 and in left lower arm 154/76.

## 2022-01-13 ENCOUNTER — HOSPITAL ENCOUNTER (OUTPATIENT)
Dept: LAB | Age: 57
Discharge: HOME | End: 2022-01-13

## 2022-01-13 ENCOUNTER — HOME CARE VISIT (OUTPATIENT)
Dept: SCHEDULING | Facility: HOME HEALTH | Age: 57
End: 2022-01-13

## 2022-01-13 VITALS
TEMPERATURE: 98.6 F | RESPIRATION RATE: 18 BRPM | HEART RATE: 67 BPM | DIASTOLIC BLOOD PRESSURE: 80 MMHG | OXYGEN SATURATION: 96 % | SYSTOLIC BLOOD PRESSURE: 142 MMHG

## 2022-01-13 LAB
BACTERIA SPEC CULT: NORMAL
BACTERIA SPEC CULT: NORMAL
CREAT SERPL-MCNC: 0.86 MG/DL (ref 0.8–1.5)
SERVICE CMNT-IMP: NORMAL
SERVICE CMNT-IMP: NORMAL
VANCOMYCIN SERPL-MCNC: 25.3 UG/ML

## 2022-01-13 PROCEDURE — G0299 HHS/HOSPICE OF RN EA 15 MIN: HCPCS

## 2022-01-13 PROCEDURE — 82565 ASSAY OF CREATININE: CPT

## 2022-01-13 PROCEDURE — 400013 HH SOC

## 2022-01-13 PROCEDURE — 80202 ASSAY OF VANCOMYCIN: CPT

## 2022-01-14 ENCOUNTER — HOME CARE VISIT (OUTPATIENT)
Dept: SCHEDULING | Facility: HOME HEALTH | Age: 57
End: 2022-01-14

## 2022-01-16 ENCOUNTER — HOME CARE VISIT (OUTPATIENT)
Dept: HOME HEALTH SERVICES | Facility: HOME HEALTH | Age: 57
End: 2022-01-16

## 2022-01-18 ENCOUNTER — HOSPITAL ENCOUNTER (OUTPATIENT)
Dept: LAB | Age: 57
Discharge: HOME | End: 2022-01-18

## 2022-01-18 ENCOUNTER — HOME CARE VISIT (OUTPATIENT)
Dept: SCHEDULING | Facility: HOME HEALTH | Age: 57
End: 2022-01-18

## 2022-01-18 VITALS
OXYGEN SATURATION: 98 % | SYSTOLIC BLOOD PRESSURE: 122 MMHG | TEMPERATURE: 98.2 F | RESPIRATION RATE: 16 BRPM | DIASTOLIC BLOOD PRESSURE: 74 MMHG | HEART RATE: 77 BPM

## 2022-01-18 LAB
BASOPHILS # BLD: 0.1 K/UL (ref 0–0.2)
BASOPHILS NFR BLD: 2 % (ref 0–2)
CREAT SERPL-MCNC: 1.2 MG/DL (ref 0.8–1.5)
DIFFERENTIAL METHOD BLD: ABNORMAL
EOSINOPHIL # BLD: 0 K/UL (ref 0–0.8)
EOSINOPHIL NFR BLD: 1 % (ref 0.5–7.8)
ERYTHROCYTE [DISTWIDTH] IN BLOOD BY AUTOMATED COUNT: 13.5 % (ref 11.9–14.6)
HCT VFR BLD AUTO: 37.1 %
HGB BLD-MCNC: 12 G/DL (ref 13.6–17.2)
IMM GRANULOCYTES # BLD AUTO: 0.1 K/UL (ref 0–0.5)
IMM GRANULOCYTES NFR BLD AUTO: 1 % (ref 0–5)
LYMPHOCYTES # BLD: 1.3 K/UL (ref 0.5–4.6)
LYMPHOCYTES NFR BLD: 19 % (ref 13–44)
MCH RBC QN AUTO: 27.3 PG (ref 26.1–32.9)
MCHC RBC AUTO-ENTMCNC: 32.3 G/DL (ref 31.4–35)
MCV RBC AUTO: 84.5 FL (ref 79.6–97.8)
MONOCYTES # BLD: 0.5 K/UL (ref 0.1–1.3)
MONOCYTES NFR BLD: 7 % (ref 4–12)
NEUTS SEG # BLD: 4.7 K/UL (ref 1.7–8.2)
NEUTS SEG NFR BLD: 71 % (ref 43–78)
NRBC # BLD: 0 K/UL (ref 0–0.2)
PLATELET # BLD AUTO: 315 K/UL (ref 150–450)
PMV BLD AUTO: 10.5 FL (ref 9.4–12.3)
RBC # BLD AUTO: 4.39 M/UL (ref 4.23–5.6)
VANCOMYCIN TROUGH SERPL-MCNC: 11.6 UG/ML (ref 5–20)
WBC # BLD AUTO: 6.6 K/UL (ref 4.3–11.1)

## 2022-01-18 PROCEDURE — 82565 ASSAY OF CREATININE: CPT

## 2022-01-18 PROCEDURE — 80202 ASSAY OF VANCOMYCIN: CPT

## 2022-01-18 PROCEDURE — 85025 COMPLETE CBC W/AUTO DIFF WBC: CPT

## 2022-01-18 PROCEDURE — G0299 HHS/HOSPICE OF RN EA 15 MIN: HCPCS

## 2022-01-18 PROCEDURE — G0155 HHCP-SVS OF CSW,EA 15 MIN: HCPCS

## 2022-01-21 ENCOUNTER — HOSPITAL ENCOUNTER (OUTPATIENT)
Dept: LAB | Age: 57
Discharge: HOME | End: 2022-01-21

## 2022-01-21 ENCOUNTER — HOME CARE VISIT (OUTPATIENT)
Dept: SCHEDULING | Facility: HOME HEALTH | Age: 57
End: 2022-01-21

## 2022-01-21 LAB
CREAT SERPL-MCNC: 1.1 MG/DL (ref 0.8–1.5)
VANCOMYCIN TROUGH SERPL-MCNC: 17.2 UG/ML (ref 5–20)

## 2022-01-21 PROCEDURE — G0299 HHS/HOSPICE OF RN EA 15 MIN: HCPCS

## 2022-01-21 PROCEDURE — 80202 ASSAY OF VANCOMYCIN: CPT

## 2022-01-21 PROCEDURE — 82565 ASSAY OF CREATININE: CPT

## 2022-01-23 VITALS
TEMPERATURE: 98.6 F | RESPIRATION RATE: 15 BRPM | HEART RATE: 74 BPM | DIASTOLIC BLOOD PRESSURE: 78 MMHG | SYSTOLIC BLOOD PRESSURE: 122 MMHG | OXYGEN SATURATION: 98 %

## 2022-01-24 ENCOUNTER — HOSPITAL ENCOUNTER (OUTPATIENT)
Dept: LAB | Age: 57
Discharge: HOME | End: 2022-01-24

## 2022-01-24 ENCOUNTER — HOME CARE VISIT (OUTPATIENT)
Dept: SCHEDULING | Facility: HOME HEALTH | Age: 57
End: 2022-01-24

## 2022-01-24 VITALS
OXYGEN SATURATION: 9 % | SYSTOLIC BLOOD PRESSURE: 126 MMHG | DIASTOLIC BLOOD PRESSURE: 80 MMHG | TEMPERATURE: 98.5 F | RESPIRATION RATE: 16 BRPM | HEART RATE: 81 BPM

## 2022-01-24 LAB
BASOPHILS # BLD: 0.1 K/UL (ref 0–0.2)
BASOPHILS NFR BLD: 3 % (ref 0–2)
CREAT SERPL-MCNC: 1.3 MG/DL (ref 0.8–1.5)
DIFFERENTIAL METHOD BLD: ABNORMAL
EOSINOPHIL # BLD: 0 K/UL (ref 0–0.8)
EOSINOPHIL NFR BLD: 1 % (ref 0.5–7.8)
ERYTHROCYTE [DISTWIDTH] IN BLOOD BY AUTOMATED COUNT: 13.2 % (ref 11.9–14.6)
HCT VFR BLD AUTO: 34.2 %
HGB BLD-MCNC: 11.1 G/DL (ref 13.6–17.2)
IMM GRANULOCYTES # BLD AUTO: 0 K/UL (ref 0–0.5)
IMM GRANULOCYTES NFR BLD AUTO: 0 % (ref 0–5)
LYMPHOCYTES # BLD: 1.4 K/UL (ref 0.5–4.6)
LYMPHOCYTES NFR BLD: 29 % (ref 13–44)
MCH RBC QN AUTO: 27.2 PG (ref 26.1–32.9)
MCHC RBC AUTO-ENTMCNC: 32.5 G/DL (ref 31.4–35)
MCV RBC AUTO: 83.8 FL (ref 79.6–97.8)
MONOCYTES # BLD: 0.5 K/UL (ref 0.1–1.3)
MONOCYTES NFR BLD: 9 % (ref 4–12)
NEUTS SEG # BLD: 2.8 K/UL (ref 1.7–8.2)
NEUTS SEG NFR BLD: 58 % (ref 43–78)
NRBC # BLD: 0 K/UL (ref 0–0.2)
PLATELET # BLD AUTO: 252 K/UL (ref 150–450)
PMV BLD AUTO: 10.5 FL (ref 9.4–12.3)
RBC # BLD AUTO: 4.08 M/UL (ref 4.23–5.6)
VANCOMYCIN TROUGH SERPL-MCNC: 18.2 UG/ML (ref 5–20)
WBC # BLD AUTO: 4.9 K/UL (ref 4.3–11.1)

## 2022-01-24 PROCEDURE — G0299 HHS/HOSPICE OF RN EA 15 MIN: HCPCS

## 2022-01-24 PROCEDURE — 82565 ASSAY OF CREATININE: CPT

## 2022-01-24 PROCEDURE — 85025 COMPLETE CBC W/AUTO DIFF WBC: CPT

## 2022-01-24 PROCEDURE — 80202 ASSAY OF VANCOMYCIN: CPT

## 2022-01-24 NOTE — HOME HEALTH
Plan for next visit:  -draw labs (creatinine, vanc trough)  -change PICC dressing  -education and evaluation

## 2022-01-27 ENCOUNTER — HOSPITAL ENCOUNTER (OUTPATIENT)
Dept: LAB | Age: 57
Discharge: HOME | End: 2022-01-27

## 2022-01-27 ENCOUNTER — HOME CARE VISIT (OUTPATIENT)
Dept: SCHEDULING | Facility: HOME HEALTH | Age: 57
End: 2022-01-27

## 2022-01-27 VITALS
SYSTOLIC BLOOD PRESSURE: 104 MMHG | RESPIRATION RATE: 15 BRPM | TEMPERATURE: 98.5 F | HEART RATE: 81 BPM | OXYGEN SATURATION: 97 % | DIASTOLIC BLOOD PRESSURE: 79 MMHG

## 2022-01-27 LAB
CREAT SERPL-MCNC: 1.14 MG/DL (ref 0.8–1.5)
VANCOMYCIN TROUGH SERPL-MCNC: 16.5 UG/ML (ref 5–20)

## 2022-01-27 PROCEDURE — 82565 ASSAY OF CREATININE: CPT

## 2022-01-27 PROCEDURE — G0299 HHS/HOSPICE OF RN EA 15 MIN: HCPCS

## 2022-01-27 PROCEDURE — 80202 ASSAY OF VANCOMYCIN: CPT

## 2022-01-31 ENCOUNTER — HOSPITAL ENCOUNTER (OUTPATIENT)
Dept: LAB | Age: 57
Discharge: HOME | End: 2022-01-31

## 2022-01-31 ENCOUNTER — HOME CARE VISIT (OUTPATIENT)
Dept: SCHEDULING | Facility: HOME HEALTH | Age: 57
End: 2022-01-31

## 2022-01-31 VITALS
SYSTOLIC BLOOD PRESSURE: 122 MMHG | OXYGEN SATURATION: 99 % | TEMPERATURE: 98 F | RESPIRATION RATE: 16 BRPM | HEART RATE: 82 BPM | DIASTOLIC BLOOD PRESSURE: 86 MMHG

## 2022-01-31 LAB
BASOPHILS # BLD: 0.1 K/UL (ref 0–0.2)
BASOPHILS NFR BLD: 2 % (ref 0–2)
CREAT SERPL-MCNC: 1.4 MG/DL (ref 0.8–1.5)
DIFFERENTIAL METHOD BLD: ABNORMAL
EOSINOPHIL # BLD: 0.1 K/UL (ref 0–0.8)
EOSINOPHIL NFR BLD: 3 % (ref 0.5–7.8)
ERYTHROCYTE [DISTWIDTH] IN BLOOD BY AUTOMATED COUNT: 13.4 % (ref 11.9–14.6)
HCT VFR BLD AUTO: 36.8 %
HGB BLD-MCNC: 11.9 G/DL (ref 13.6–17.2)
IMM GRANULOCYTES # BLD AUTO: 0 K/UL (ref 0–0.5)
IMM GRANULOCYTES NFR BLD AUTO: 0 % (ref 0–5)
LYMPHOCYTES # BLD: 1.6 K/UL (ref 0.5–4.6)
LYMPHOCYTES NFR BLD: 36 % (ref 13–44)
MCH RBC QN AUTO: 26.9 PG (ref 26.1–32.9)
MCHC RBC AUTO-ENTMCNC: 32.3 G/DL (ref 31.4–35)
MCV RBC AUTO: 83.1 FL (ref 79.6–97.8)
MONOCYTES # BLD: 0.3 K/UL (ref 0.1–1.3)
MONOCYTES NFR BLD: 7 % (ref 4–12)
NEUTS SEG # BLD: 2.2 K/UL (ref 1.7–8.2)
NEUTS SEG NFR BLD: 52 % (ref 43–78)
NRBC # BLD: 0 K/UL (ref 0–0.2)
PLATELET # BLD AUTO: 193 K/UL (ref 150–450)
PMV BLD AUTO: 11.5 FL (ref 9.4–12.3)
RBC # BLD AUTO: 4.43 M/UL (ref 4.23–5.6)
VANCOMYCIN TROUGH SERPL-MCNC: 17.3 UG/ML (ref 5–20)
WBC # BLD AUTO: 4.4 K/UL (ref 4.3–11.1)

## 2022-01-31 PROCEDURE — 82565 ASSAY OF CREATININE: CPT

## 2022-01-31 PROCEDURE — G0299 HHS/HOSPICE OF RN EA 15 MIN: HCPCS

## 2022-01-31 PROCEDURE — 80202 ASSAY OF VANCOMYCIN: CPT

## 2022-01-31 PROCEDURE — 85025 COMPLETE CBC W/AUTO DIFF WBC: CPT

## 2022-02-03 ENCOUNTER — HOME CARE VISIT (OUTPATIENT)
Dept: SCHEDULING | Facility: HOME HEALTH | Age: 57
End: 2022-02-03

## 2022-02-03 ENCOUNTER — HOSPITAL ENCOUNTER (OUTPATIENT)
Dept: LAB | Age: 57
Discharge: HOME | End: 2022-02-03

## 2022-02-03 VITALS
OXYGEN SATURATION: 99 % | SYSTOLIC BLOOD PRESSURE: 128 MMHG | DIASTOLIC BLOOD PRESSURE: 82 MMHG | RESPIRATION RATE: 16 BRPM | TEMPERATURE: 97.8 F | HEART RATE: 82 BPM

## 2022-02-03 LAB
CREAT SERPL-MCNC: 1.6 MG/DL (ref 0.8–1.5)
VANCOMYCIN TROUGH SERPL-MCNC: 19.7 UG/ML (ref 5–20)

## 2022-02-03 PROCEDURE — 82565 ASSAY OF CREATININE: CPT

## 2022-02-03 PROCEDURE — G0299 HHS/HOSPICE OF RN EA 15 MIN: HCPCS

## 2022-02-03 PROCEDURE — 80202 ASSAY OF VANCOMYCIN: CPT

## 2022-02-07 ENCOUNTER — HOSPITAL ENCOUNTER (OUTPATIENT)
Dept: LAB | Age: 57
Discharge: HOME | End: 2022-02-07

## 2022-02-07 ENCOUNTER — HOME CARE VISIT (OUTPATIENT)
Dept: SCHEDULING | Facility: HOME HEALTH | Age: 57
End: 2022-02-07

## 2022-02-07 VITALS
DIASTOLIC BLOOD PRESSURE: 103 MMHG | RESPIRATION RATE: 18 BRPM | SYSTOLIC BLOOD PRESSURE: 145 MMHG | HEART RATE: 81 BPM | TEMPERATURE: 98 F | OXYGEN SATURATION: 98 %

## 2022-02-07 LAB
BASOPHILS # BLD: 0.1 K/UL (ref 0–0.2)
BASOPHILS NFR BLD: 1 % (ref 0–2)
CREAT SERPL-MCNC: 1.3 MG/DL (ref 0.8–1.5)
DIFFERENTIAL METHOD BLD: ABNORMAL
EOSINOPHIL # BLD: 0.1 K/UL (ref 0–0.8)
EOSINOPHIL NFR BLD: 1 % (ref 0.5–7.8)
ERYTHROCYTE [DISTWIDTH] IN BLOOD BY AUTOMATED COUNT: 13.6 % (ref 11.9–14.6)
HCT VFR BLD AUTO: 36.9 % (ref 41.1–50.3)
HGB BLD-MCNC: 12.1 G/DL (ref 13.6–17.2)
IMM GRANULOCYTES # BLD AUTO: 0 K/UL (ref 0–0.5)
IMM GRANULOCYTES NFR BLD AUTO: 0 % (ref 0–5)
LYMPHOCYTES # BLD: 1.8 K/UL (ref 0.5–4.6)
LYMPHOCYTES NFR BLD: 36 % (ref 13–44)
MCH RBC QN AUTO: 27.1 PG (ref 26.1–32.9)
MCHC RBC AUTO-ENTMCNC: 32.8 G/DL (ref 31.4–35)
MCV RBC AUTO: 82.7 FL (ref 79.6–97.8)
MONOCYTES # BLD: 0.4 K/UL (ref 0.1–1.3)
MONOCYTES NFR BLD: 7 % (ref 4–12)
NEUTS SEG # BLD: 2.7 K/UL (ref 1.7–8.2)
NEUTS SEG NFR BLD: 54 % (ref 43–78)
NRBC # BLD: 0 K/UL (ref 0–0.2)
PLATELET # BLD AUTO: 177 K/UL (ref 150–450)
PMV BLD AUTO: 11.4 FL (ref 9.4–12.3)
RBC # BLD AUTO: 4.46 M/UL (ref 4.23–5.6)
VANCOMYCIN TROUGH SERPL-MCNC: 17.8 UG/ML (ref 5–20)
WBC # BLD AUTO: 5 K/UL (ref 4.3–11.1)

## 2022-02-07 PROCEDURE — 85025 COMPLETE CBC W/AUTO DIFF WBC: CPT

## 2022-02-07 PROCEDURE — 82565 ASSAY OF CREATININE: CPT

## 2022-02-07 PROCEDURE — 80202 ASSAY OF VANCOMYCIN: CPT

## 2022-02-07 PROCEDURE — G0299 HHS/HOSPICE OF RN EA 15 MIN: HCPCS

## 2022-02-10 ENCOUNTER — HOME CARE VISIT (OUTPATIENT)
Dept: SCHEDULING | Facility: HOME HEALTH | Age: 57
End: 2022-02-10

## 2022-02-10 ENCOUNTER — HOSPITAL ENCOUNTER (OUTPATIENT)
Dept: LAB | Age: 57
Discharge: HOME | End: 2022-02-10

## 2022-02-10 VITALS
TEMPERATURE: 98.7 F | DIASTOLIC BLOOD PRESSURE: 80 MMHG | HEART RATE: 78 BPM | OXYGEN SATURATION: 99 % | SYSTOLIC BLOOD PRESSURE: 132 MMHG | RESPIRATION RATE: 18 BRPM

## 2022-02-10 LAB
CREAT SERPL-MCNC: 1.2 MG/DL (ref 0.8–1.5)
VANCOMYCIN TROUGH SERPL-MCNC: 18.1 UG/ML (ref 5–20)

## 2022-02-10 PROCEDURE — 80202 ASSAY OF VANCOMYCIN: CPT

## 2022-02-10 PROCEDURE — 82565 ASSAY OF CREATININE: CPT

## 2022-02-10 PROCEDURE — G0299 HHS/HOSPICE OF RN EA 15 MIN: HCPCS

## 2022-02-14 ENCOUNTER — HOSPITAL ENCOUNTER (OUTPATIENT)
Dept: LAB | Age: 57
Discharge: HOME | End: 2022-02-14

## 2022-02-14 ENCOUNTER — HOME CARE VISIT (OUTPATIENT)
Dept: SCHEDULING | Facility: HOME HEALTH | Age: 57
End: 2022-02-14

## 2022-02-14 VITALS
TEMPERATURE: 97.7 F | SYSTOLIC BLOOD PRESSURE: 128 MMHG | HEART RATE: 80 BPM | DIASTOLIC BLOOD PRESSURE: 72 MMHG | RESPIRATION RATE: 15 BRPM | OXYGEN SATURATION: 99 %

## 2022-02-14 LAB
BASOPHILS # BLD: 0.1 K/UL (ref 0–0.2)
BASOPHILS NFR BLD: 1 % (ref 0–2)
CREAT SERPL-MCNC: 1.4 MG/DL (ref 0.8–1.5)
DIFFERENTIAL METHOD BLD: ABNORMAL
EOSINOPHIL # BLD: 0.1 K/UL (ref 0–0.8)
EOSINOPHIL NFR BLD: 1 % (ref 0.5–7.8)
ERYTHROCYTE [DISTWIDTH] IN BLOOD BY AUTOMATED COUNT: 14.2 % (ref 11.9–14.6)
HCT VFR BLD AUTO: 36.3 %
HGB BLD-MCNC: 11.8 G/DL (ref 13.6–17.2)
IMM GRANULOCYTES # BLD AUTO: 0 K/UL (ref 0–0.5)
IMM GRANULOCYTES NFR BLD AUTO: 1 % (ref 0–5)
LYMPHOCYTES # BLD: 1.6 K/UL (ref 0.5–4.6)
LYMPHOCYTES NFR BLD: 36 % (ref 13–44)
MCH RBC QN AUTO: 26.8 PG (ref 26.1–32.9)
MCHC RBC AUTO-ENTMCNC: 32.5 G/DL (ref 31.4–35)
MCV RBC AUTO: 82.5 FL (ref 79.6–97.8)
MONOCYTES # BLD: 0.4 K/UL (ref 0.1–1.3)
MONOCYTES NFR BLD: 9 % (ref 4–12)
NEUTS SEG # BLD: 2.2 K/UL (ref 1.7–8.2)
NEUTS SEG NFR BLD: 52 % (ref 43–78)
NRBC # BLD: 0 K/UL (ref 0–0.2)
PLATELET # BLD AUTO: 162 K/UL (ref 150–450)
PMV BLD AUTO: 11.2 FL (ref 9.4–12.3)
RBC # BLD AUTO: 4.4 M/UL (ref 4.23–5.6)
VANCOMYCIN TROUGH SERPL-MCNC: 15.3 UG/ML (ref 5–20)
WBC # BLD AUTO: 4.3 K/UL (ref 4.3–11.1)

## 2022-02-14 PROCEDURE — 85025 COMPLETE CBC W/AUTO DIFF WBC: CPT

## 2022-02-14 PROCEDURE — 82565 ASSAY OF CREATININE: CPT

## 2022-02-14 PROCEDURE — G0299 HHS/HOSPICE OF RN EA 15 MIN: HCPCS

## 2022-02-14 PROCEDURE — 80202 ASSAY OF VANCOMYCIN: CPT

## 2022-02-17 ENCOUNTER — HOME CARE VISIT (OUTPATIENT)
Dept: SCHEDULING | Facility: HOME HEALTH | Age: 57
End: 2022-02-17

## 2022-02-17 ENCOUNTER — HOSPITAL ENCOUNTER (OUTPATIENT)
Dept: LAB | Age: 57
Discharge: HOME | End: 2022-02-17

## 2022-02-17 VITALS
SYSTOLIC BLOOD PRESSURE: 142 MMHG | HEART RATE: 73 BPM | TEMPERATURE: 97.7 F | DIASTOLIC BLOOD PRESSURE: 78 MMHG | OXYGEN SATURATION: 98 % | RESPIRATION RATE: 15 BRPM

## 2022-02-17 LAB
CREAT SERPL-MCNC: 1.5 MG/DL (ref 0.8–1.5)
VANCOMYCIN TROUGH SERPL-MCNC: 16.8 UG/ML (ref 5–20)

## 2022-02-17 PROCEDURE — G0299 HHS/HOSPICE OF RN EA 15 MIN: HCPCS

## 2022-02-17 PROCEDURE — 80202 ASSAY OF VANCOMYCIN: CPT

## 2022-02-17 PROCEDURE — 82565 ASSAY OF CREATININE: CPT

## 2022-02-21 ENCOUNTER — HOME CARE VISIT (OUTPATIENT)
Dept: SCHEDULING | Facility: HOME HEALTH | Age: 57
End: 2022-02-21

## 2022-02-21 VITALS
SYSTOLIC BLOOD PRESSURE: 134 MMHG | HEART RATE: 73 BPM | OXYGEN SATURATION: 99 % | TEMPERATURE: 98.6 F | RESPIRATION RATE: 16 BRPM | DIASTOLIC BLOOD PRESSURE: 80 MMHG

## 2022-02-21 PROCEDURE — G0299 HHS/HOSPICE OF RN EA 15 MIN: HCPCS

## 2022-03-18 PROBLEM — N17.9 AKI (ACUTE KIDNEY INJURY) (HCC): Status: ACTIVE | Noted: 2022-01-05

## 2022-03-18 PROBLEM — I76 SEPTIC EMBOLISM (HCC): Status: ACTIVE | Noted: 2022-01-06

## 2022-03-18 PROBLEM — R07.9 CHEST PAIN: Status: ACTIVE | Noted: 2022-01-05

## 2022-03-18 PROBLEM — R05.9 COUGH: Status: ACTIVE | Noted: 2022-01-05

## 2022-03-19 PROBLEM — I82.619 BASILIC VEIN THROMBOSIS: Status: ACTIVE | Noted: 2022-01-05

## 2022-03-19 PROBLEM — E87.1 HYPONATREMIA: Status: ACTIVE | Noted: 2022-01-05

## 2022-03-19 PROBLEM — A41.9 SEPSIS (HCC): Status: ACTIVE | Noted: 2022-01-05

## 2022-03-19 PROBLEM — R78.81 BACTEREMIA DUE TO GRAM-POSITIVE BACTERIA: Status: ACTIVE | Noted: 2022-01-06

## 2022-03-19 PROBLEM — L03.90 CELLULITIS: Status: ACTIVE | Noted: 2022-01-05

## 2022-06-27 ENCOUNTER — HOSPITAL ENCOUNTER (EMERGENCY)
Age: 57
Discharge: HOME | End: 2022-06-27
Attending: GENERAL PRACTICE

## 2022-06-27 ENCOUNTER — HOSPITAL ENCOUNTER (EMERGENCY)
Dept: GENERAL RADIOLOGY | Age: 57
Discharge: HOME | End: 2022-06-30

## 2022-06-27 VITALS
RESPIRATION RATE: 17 BRPM | DIASTOLIC BLOOD PRESSURE: 95 MMHG | HEIGHT: 72 IN | SYSTOLIC BLOOD PRESSURE: 146 MMHG | BODY MASS INDEX: 28.44 KG/M2 | OXYGEN SATURATION: 98 % | WEIGHT: 210 LBS | TEMPERATURE: 98.4 F | HEART RATE: 55 BPM

## 2022-06-27 DIAGNOSIS — S81.811A LACERATION OF RIGHT LOWER EXTREMITY, INITIAL ENCOUNTER: Primary | ICD-10-CM

## 2022-06-27 PROCEDURE — 99284 EMERGENCY DEPT VISIT MOD MDM: CPT

## 2022-06-27 PROCEDURE — 73590 X-RAY EXAM OF LOWER LEG: CPT

## 2022-06-27 PROCEDURE — 90714 TD VACC NO PRESV 7 YRS+ IM: CPT | Performed by: GENERAL PRACTICE

## 2022-06-27 PROCEDURE — 90471 IMMUNIZATION ADMIN: CPT | Performed by: GENERAL PRACTICE

## 2022-06-27 PROCEDURE — 12002 RPR S/N/AX/GEN/TRNK2.6-7.5CM: CPT

## 2022-06-27 PROCEDURE — 6360000002 HC RX W HCPCS: Performed by: GENERAL PRACTICE

## 2022-06-27 RX ORDER — SULFAMETHOXAZOLE AND TRIMETHOPRIM 800; 160 MG/1; MG/1
1 TABLET ORAL 2 TIMES DAILY
Qty: 20 TABLET | Refills: 0 | Status: SHIPPED | OUTPATIENT
Start: 2022-06-27 | End: 2022-07-07

## 2022-06-27 RX ORDER — TETANUS AND DIPHTHERIA TOXOIDS ADSORBED 2; 2 [LF]/.5ML; [LF]/.5ML
0.5 INJECTION INTRAMUSCULAR
Status: COMPLETED | OUTPATIENT
Start: 2022-06-27 | End: 2022-06-27

## 2022-06-27 RX ADMIN — TETANUS AND DIPHTHERIA TOXOIDS ADSORBED 0.5 ML: 2; 2 INJECTION INTRAMUSCULAR at 11:16

## 2022-06-27 ASSESSMENT — PAIN DESCRIPTION - ORIENTATION
ORIENTATION: RIGHT;LOWER
ORIENTATION: RIGHT;LOWER

## 2022-06-27 ASSESSMENT — LIFESTYLE VARIABLES: HOW OFTEN DO YOU HAVE A DRINK CONTAINING ALCOHOL: NEVER

## 2022-06-27 ASSESSMENT — ENCOUNTER SYMPTOMS
NAUSEA: 0
ABDOMINAL PAIN: 0
VOMITING: 0
DIARRHEA: 0
SHORTNESS OF BREATH: 0

## 2022-06-27 ASSESSMENT — PAIN DESCRIPTION - DESCRIPTORS
DESCRIPTORS: THROBBING
DESCRIPTORS: THROBBING

## 2022-06-27 ASSESSMENT — PAIN DESCRIPTION - LOCATION
LOCATION: LEG
LOCATION: LEG

## 2022-06-27 ASSESSMENT — PAIN SCALES - GENERAL
PAINLEVEL_OUTOF10: 3
PAINLEVEL_OUTOF10: 4

## 2022-06-27 NOTE — ED NOTES
I have reviewed discharge instructions with the patient. The patient verbalized understanding. Patient left ED via Discharge Method: ambulatory to Home with spouse. Opportunity for questions and clarification provided. Patient given 1 scripts. To continue your aftercare when you leave the hospital, you may receive an automated call from our care team to check in on how you are doing. This is a free service and part of our promise to provide the best care and service to meet your aftercare needs.  If you have questions, or wish to unsubscribe from this service please call [phone]. Thank you for Choosing our [facility] Emergency Department.         Shannan Mejia RN  06/27/22 0561

## 2022-06-27 NOTE — ED TRIAGE NOTES
Pt arrives ambulatory to triage. Cut right lower leg above ankle with saw about an hour ago. Unsure of last tetanus. Pt denies changes in sensation. Good ROM. About 2 inches long. NAD. Masked.

## 2022-06-27 NOTE — ED PROVIDER NOTES
[facility] Emergency Department Provider Note                   PCP:                None Provider               Age: 64 y.o. Sex: male       ICD-10-CM    1. Laceration of right lower extremity, initial encounter  S81.811A sulfamethoxazole-trimethoprim (BACTRIM DS) 800-160 MG per tablet       DISPOSITION         Discharge Medication List as of 6/27/2022 12:07 PM      START taking these medications    Details   sulfamethoxazole-trimethoprim (BACTRIM DS) 800-160 MG per tablet Take 1 tablet by mouth 2 times daily for 10 days, Disp-20 tablet, R-0Print             Orders Placed This Encounter   Procedures    LACERATION REPAIR    XR TIBIA FIBULA RIGHT (2 VIEWS)        VINCENT Gallegos 12:26 PM      MDM  Number of Diagnoses or Management Options  Laceration of right lower extremity, initial encounter  Diagnosis management comments: Vital signs reviewed, patient stable, NAD, afebrile, nontoxic in appearance    X-ray right lower extremity ordered out of triage  Negative for foreign body, acute bony abnormality, fracture    Patient is neurovascularly intact  4 centimeter laceration to medial aspect of distal right lower extremity. Patient has full active range of motion. Rest of patient's physical exam was benign    Stated that he did not have a preference of sutures versus staples. Wound was cleaned extensively with 1100 cc of sterile water mixed with Betadine. Betadine also put directly into the wound prior to rinsing. Tolerated procedure well    Based on history, physical exam, current imaging, I do not feel any further  imaging is warranted at this time in the emergency department    I discussed signs and symptoms that would warrant a prompt return to the emergency department with the patient. I included the signs and symptoms on discharge paperwork. Patient verbalized that they understood. Patient discharged home in stable condition with a prescription for Bactrim.   He is to return to the ED or on file   [address] [employer] or Organizations: Not on file    Attends Club or Organization Meetings: Not on file    Marital Status: Not on file        No Known Allergies     Vitals signs and nursing note reviewed. Patient Vitals for the past 4 hrs:   Temp Pulse Resp BP SpO2   06/27/22 1212 98.4 °F (36.9 °C) 55 17 (!) 146/95 98 %   06/27/22 1030 98.2 °F (36.8 °C) 61 16 (!) 141/91 98 %          Physical Exam  Vitals and nursing note reviewed. Constitutional:       General: He is not in acute distress. Appearance: Normal appearance. He is normal weight. He is not ill-appearing, toxic-appearing or diaphoretic. HENT:      Head: Normocephalic and atraumatic. Eyes:      General: No scleral icterus. Extraocular Movements: Extraocular movements intact. Conjunctiva/sclera: Conjunctivae normal.   Cardiovascular:      Rate and Rhythm: Normal rate. Pulses: Normal pulses. Heart sounds: Normal heart sounds. Comments: 2+ PT and DP pulses on right side  Pulmonary:      Effort: Pulmonary effort is normal.      Breath sounds: Normal breath sounds. Abdominal:      General: Bowel sounds are normal.      Palpations: Abdomen is soft. Tenderness: There is no abdominal tenderness. Musculoskeletal:         General: Signs of injury (4 cm laceration to distal right lower extremity in the medial aspect) present. No swelling or deformity. Normal range of motion. Cervical back: Normal range of motion. Legs:       Comments: Has full active range of motion flexion extension eversion inversion of right ankle       Skin:     General: Skin is warm and dry. Capillary Refill: Capillary refill takes less than 2 seconds. Neurological:      General: No focal deficit present. Mental Status: He is alert and oriented to person, place, and time.       Sensory: No sensory deficit (Sensation intact toes 1 through 5 on right foot, dorsal and plantar aspects of right foot, medial lateral anterior and posterior aspects of right ankle). Motor: No weakness. Comments: 5+ muscle strength in right lower extremity   Psychiatric:         Mood and Affect: Mood normal.         Behavior: Behavior normal.         Thought Content: Thought content normal.         Judgment: Judgment normal.          Lac Repair    Date/Time: 6/27/2022 12:22 PM  Performed by: VINCENT Painting  Authorized by: Zuri Killian DO     Consent:     Consent obtained:  Verbal    Consent given by:  Patient    Risks discussed:  Infection, pain, retained foreign body, tendon damage, poor cosmetic result, need for additional repair, nerve damage, poor wound healing and vascular damage    Alternatives discussed:  No treatment  Anesthesia (see MAR for exact dosages):      Anesthesia method:  Local infiltration    Local anesthetic:  Lidocaine 1% WITH epi  Laceration details:     Location:  Leg    Leg location:  R lower leg    Length (cm):  4    Depth (mm):  4  Repair type:     Repair type:  Simple  Pre-procedure details:     Preparation:  Patient was prepped and draped in usual sterile fashion and imaging obtained to evaluate for foreign bodies  Exploration:     Hemostasis achieved with:  Direct pressure    Wound exploration: wound explored through full range of motion and entire depth of wound probed and visualized      Wound extent: areolar tissue violated      Wound extent: no fascia violation noted, no foreign bodies/material noted, no muscle damage noted, no nerve damage noted, no tendon damage noted, no underlying fracture noted and no vascular damage noted      Contaminated: no    Treatment:     Area cleansed with:  Betadine    Amount of cleaning:  Extensive    Irrigation solution:  Sterile saline (Mixed with Betadine)    Irrigation volume:  1100cc    Irrigation method:  Tap and pressure wash    Visualized foreign bodies/material removed: no (Foreign body visualized)    Skin repair:     Repair method:  Staples    Number of staples:  11  Approximation:     Approximation:  Close  Post-procedure details:     Dressing:  Non-adherent dressing    Patient tolerance of procedure: Tolerated well, no immediate complications          Labs Reviewed - No data to display     XR TIBIA FIBULA RIGHT (2 VIEWS)   Final Result   No fracture or retained radiopaque foreign body. ED Course as of 06/27/22 1226   Mon Jun 27, 2022   1106 XR TIBIA FIBULA RIGHT (2 VIEWS)  FINDINGS: Two views of the right tibia and fibula submitted. There is no  fracture or retained radiopaque foreign body. No destructive bone changes  present.     IMPRESSION:  No fracture or retained radiopaque foreign body. [JG]      ED Course User Index  [JG] VINCENT Nelson        Voice dictation software was used during the making of this note. This software is not perfect and grammatical and other typographical errors may be present. This note has not been completely proofread for errors.      Greg Nelson  06/27/22 1226